# Patient Record
Sex: MALE | Race: BLACK OR AFRICAN AMERICAN | Employment: PART TIME | URBAN - METROPOLITAN AREA
[De-identification: names, ages, dates, MRNs, and addresses within clinical notes are randomized per-mention and may not be internally consistent; named-entity substitution may affect disease eponyms.]

---

## 2021-03-02 ENCOUNTER — IMMUNIZATION (OUTPATIENT)
Dept: INTERNAL MEDICINE CLINIC | Age: 36
End: 2021-03-02
Payer: MEDICAID

## 2021-03-02 DIAGNOSIS — Z23 ENCOUNTER FOR IMMUNIZATION: Primary | ICD-10-CM

## 2021-03-02 PROCEDURE — 0001A COVID-19, MRNA, LNP-S, PF, 30MCG/0.3ML DOSE(PFIZER): CPT | Performed by: FAMILY MEDICINE

## 2021-03-02 PROCEDURE — 91300 COVID-19, MRNA, LNP-S, PF, 30MCG/0.3ML DOSE(PFIZER): CPT | Performed by: FAMILY MEDICINE

## 2021-03-23 ENCOUNTER — IMMUNIZATION (OUTPATIENT)
Dept: INTERNAL MEDICINE CLINIC | Age: 36
End: 2021-03-23
Payer: MEDICAID

## 2021-03-23 DIAGNOSIS — Z23 ENCOUNTER FOR IMMUNIZATION: Primary | ICD-10-CM

## 2021-03-23 PROCEDURE — 91300 COVID-19, MRNA, LNP-S, PF, 30MCG/0.3ML DOSE(PFIZER): CPT | Performed by: FAMILY MEDICINE

## 2021-03-23 PROCEDURE — 0002A COVID-19, MRNA, LNP-S, PF, 30MCG/0.3ML DOSE(PFIZER): CPT | Performed by: FAMILY MEDICINE

## 2021-11-05 ENCOUNTER — IMMUNIZATION (OUTPATIENT)
Dept: INTERNAL MEDICINE CLINIC | Age: 36
End: 2021-11-05
Payer: MEDICAID

## 2021-11-05 DIAGNOSIS — Z23 ENCOUNTER FOR IMMUNIZATION: Primary | ICD-10-CM

## 2021-11-05 PROCEDURE — 0003A PR ADM SARSCOV2 30MCG/0.3ML 3RD (0003A): CPT | Performed by: FAMILY MEDICINE

## 2021-11-05 PROCEDURE — 91300 COVID-19, MRNA, LNP-S, PF, 30MCG/0.3ML DOSE(PFIZER): CPT | Performed by: FAMILY MEDICINE

## 2023-05-11 RX ORDER — HYDROCODONE BITARTRATE AND ACETAMINOPHEN 5; 325 MG/1; MG/1
1 TABLET ORAL EVERY 4 HOURS PRN
COMMUNITY
Start: 2011-07-12

## 2023-08-04 ENCOUNTER — HOSPITAL ENCOUNTER (INPATIENT)
Facility: HOSPITAL | Age: 38
LOS: 12 days | Discharge: HOME | DRG: 881 | End: 2023-08-16
Attending: EMERGENCY MEDICINE | Admitting: PSYCHIATRY & NEUROLOGY
Payer: COMMERCIAL

## 2023-08-04 ENCOUNTER — HOSPITAL ENCOUNTER (EMERGENCY)
Facility: HOSPITAL | Age: 38
Discharge: HOME | End: 2023-08-04
Attending: EMERGENCY MEDICINE
Payer: COMMERCIAL

## 2023-08-04 VITALS
TEMPERATURE: 97.9 F | BODY MASS INDEX: 40.81 KG/M2 | OXYGEN SATURATION: 95 % | RESPIRATION RATE: 20 BRPM | HEIGHT: 67 IN | HEART RATE: 84 BPM | WEIGHT: 260 LBS | DIASTOLIC BLOOD PRESSURE: 109 MMHG | SYSTOLIC BLOOD PRESSURE: 157 MMHG

## 2023-08-04 DIAGNOSIS — R45.851 SUICIDAL THOUGHTS: ICD-10-CM

## 2023-08-04 DIAGNOSIS — F32.A DEPRESSION, UNSPECIFIED DEPRESSION TYPE: ICD-10-CM

## 2023-08-04 DIAGNOSIS — R45.851 SUICIDAL IDEATIONS: Primary | ICD-10-CM

## 2023-08-04 DIAGNOSIS — F11.90 METHADONE USE: ICD-10-CM

## 2023-08-04 DIAGNOSIS — Z00.8 EVALUATION BY PSYCHIATRIC SERVICE REQUIRED: ICD-10-CM

## 2023-08-04 DIAGNOSIS — F43.10 PTSD (POST-TRAUMATIC STRESS DISORDER): ICD-10-CM

## 2023-08-04 DIAGNOSIS — F41.1 ANXIETY STATE: Primary | ICD-10-CM

## 2023-08-04 PROBLEM — F41.9 ANXIETY: Status: ACTIVE | Noted: 2023-08-04

## 2023-08-04 LAB
ALBUMIN SERPL-MCNC: 4 G/DL (ref 3.5–5)
ALBUMIN/GLOB SERPL: 1.2 (ref 1.1–2.2)
ALP SERPL-CCNC: 111 U/L (ref 45–117)
ALT SERPL-CCNC: 25 U/L (ref 12–78)
AMPHET UR QL SCN: NEGATIVE
AMPHET UR QL SCN: NEGATIVE
ANION GAP SERPL CALC-SCNC: 5 MMOL/L (ref 5–15)
APPEARANCE UR: CLEAR
AST SERPL-CCNC: 13 U/L (ref 15–37)
BACTERIA URNS QL MICRO: NEGATIVE /HPF
BARBITURATES UR QL SCN: NEGATIVE
BARBITURATES UR QL SCN: NEGATIVE
BASOPHILS # BLD: 0.1 K/UL (ref 0–0.1)
BASOPHILS NFR BLD: 1 % (ref 0–1)
BENZODIAZ UR QL: NEGATIVE
BENZODIAZ UR QL: NEGATIVE
BILIRUB SERPL-MCNC: 0.1 MG/DL (ref 0.2–1)
BILIRUB UR QL: NEGATIVE
BUN SERPL-MCNC: 13 MG/DL (ref 6–20)
BUN/CREAT SERPL: 14 (ref 12–20)
CALCIUM SERPL-MCNC: 9 MG/DL (ref 8.5–10.1)
CANNABINOIDS UR QL SCN: NEGATIVE
CANNABINOIDS UR QL SCN: NEGATIVE
CHLORIDE SERPL-SCNC: 105 MMOL/L (ref 97–108)
CO2 SERPL-SCNC: 32 MMOL/L (ref 21–32)
COCAINE UR QL SCN: NEGATIVE
COCAINE UR QL SCN: NEGATIVE
COLOR UR: ABNORMAL
CREAT SERPL-MCNC: 0.96 MG/DL (ref 0.7–1.3)
DIFFERENTIAL METHOD BLD: ABNORMAL
EOSINOPHIL # BLD: 0.3 K/UL (ref 0–0.4)
EOSINOPHIL NFR BLD: 2 % (ref 0–7)
EPITH CASTS URNS QL MICRO: ABNORMAL /LPF
ERYTHROCYTE [DISTWIDTH] IN BLOOD BY AUTOMATED COUNT: 13.2 % (ref 11.5–14.5)
ETHANOL SERPL-MCNC: <10 MG/DL (ref 0–0.08)
ETHANOL SERPL-MCNC: <10 MG/DL (ref 0–0.08)
FLUAV RNA SPEC QL NAA+PROBE: NOT DETECTED
FLUBV RNA SPEC QL NAA+PROBE: NOT DETECTED
GLOBULIN SER CALC-MCNC: 3.3 G/DL (ref 2–4)
GLUCOSE SERPL-MCNC: 110 MG/DL (ref 65–100)
GLUCOSE UR STRIP.AUTO-MCNC: NEGATIVE MG/DL
HCT VFR BLD AUTO: 41.5 % (ref 36.6–50.3)
HGB BLD-MCNC: 13.2 G/DL (ref 12.1–17)
HGB UR QL STRIP: ABNORMAL
IMM GRANULOCYTES # BLD AUTO: 0.1 K/UL (ref 0–0.04)
IMM GRANULOCYTES NFR BLD AUTO: 1 % (ref 0–0.5)
KETONES UR QL STRIP.AUTO: NEGATIVE MG/DL
LEUKOCYTE ESTERASE UR QL STRIP.AUTO: NEGATIVE
LYMPHOCYTES # BLD: 3.1 K/UL (ref 0.8–3.5)
LYMPHOCYTES NFR BLD: 26 % (ref 12–49)
Lab: ABNORMAL
Lab: ABNORMAL
MCH RBC QN AUTO: 28.4 PG (ref 26–34)
MCHC RBC AUTO-ENTMCNC: 31.8 G/DL (ref 30–36.5)
MCV RBC AUTO: 89.2 FL (ref 80–99)
METHADONE UR QL: POSITIVE
METHADONE UR QL: POSITIVE
MONOCYTES # BLD: 1 K/UL (ref 0–1)
MONOCYTES NFR BLD: 8 % (ref 5–13)
NEUTS SEG # BLD: 7.3 K/UL (ref 1.8–8)
NEUTS SEG NFR BLD: 62 % (ref 32–75)
NITRITE UR QL STRIP.AUTO: NEGATIVE
NRBC # BLD: 0 K/UL (ref 0–0.01)
NRBC BLD-RTO: 0 PER 100 WBC
OPIATES UR QL: POSITIVE
OPIATES UR QL: POSITIVE
PCP UR QL: NEGATIVE
PCP UR QL: NEGATIVE
PH UR STRIP: 7 (ref 5–8)
PLATELET # BLD AUTO: 246 K/UL (ref 150–400)
PMV BLD AUTO: 11.1 FL (ref 8.9–12.9)
POTASSIUM SERPL-SCNC: 4.4 MMOL/L (ref 3.5–5.1)
PROT SERPL-MCNC: 7.3 G/DL (ref 6.4–8.2)
PROT UR STRIP-MCNC: NEGATIVE MG/DL
RBC # BLD AUTO: 4.65 M/UL (ref 4.1–5.7)
RBC #/AREA URNS HPF: ABNORMAL /HPF (ref 0–5)
SARS-COV-2 RNA RESP QL NAA+PROBE: NOT DETECTED
SODIUM SERPL-SCNC: 142 MMOL/L (ref 136–145)
SP GR UR REFRACTOMETRY: 1.02
URINE CULTURE IF INDICATED: ABNORMAL
UROBILINOGEN UR QL STRIP.AUTO: 2 EU/DL (ref 0.2–1)
WBC # BLD AUTO: 11.9 K/UL (ref 4.1–11.1)
WBC URNS QL MICRO: ABNORMAL /HPF (ref 0–4)

## 2023-08-04 PROCEDURE — 81001 URINALYSIS AUTO W/SCOPE: CPT

## 2023-08-04 PROCEDURE — 80053 COMPREHEN METABOLIC PANEL: CPT

## 2023-08-04 PROCEDURE — 36415 COLL VENOUS BLD VENIPUNCTURE: CPT

## 2023-08-04 PROCEDURE — 1240000000 HC EMOTIONAL WELLNESS R&B

## 2023-08-04 PROCEDURE — 6370000000 HC RX 637 (ALT 250 FOR IP)

## 2023-08-04 PROCEDURE — 82077 ASSAY SPEC XCP UR&BREATH IA: CPT

## 2023-08-04 PROCEDURE — 90791 PSYCH DIAGNOSTIC EVALUATION: CPT

## 2023-08-04 PROCEDURE — 80307 DRUG TEST PRSMV CHEM ANLYZR: CPT

## 2023-08-04 PROCEDURE — 87636 SARSCOV2 & INF A&B AMP PRB: CPT

## 2023-08-04 PROCEDURE — 99285 EMERGENCY DEPT VISIT HI MDM: CPT

## 2023-08-04 PROCEDURE — 99283 EMERGENCY DEPT VISIT LOW MDM: CPT

## 2023-08-04 PROCEDURE — 85025 COMPLETE CBC W/AUTO DIFF WBC: CPT

## 2023-08-04 RX ORDER — DIPHENHYDRAMINE HYDROCHLORIDE 50 MG/ML
50 INJECTION INTRAMUSCULAR; INTRAVENOUS EVERY 4 HOURS PRN
Status: DISCONTINUED | OUTPATIENT
Start: 2023-08-04 | End: 2023-08-16 | Stop reason: HOSPADM

## 2023-08-04 RX ORDER — ACETAMINOPHEN 325 MG/1
650 TABLET ORAL EVERY 4 HOURS PRN
Status: DISCONTINUED | OUTPATIENT
Start: 2023-08-04 | End: 2023-08-16 | Stop reason: HOSPADM

## 2023-08-04 RX ORDER — TRAZODONE HYDROCHLORIDE 50 MG/1
50 TABLET ORAL NIGHTLY PRN
Status: DISCONTINUED | OUTPATIENT
Start: 2023-08-04 | End: 2023-08-16 | Stop reason: HOSPADM

## 2023-08-04 RX ORDER — HALOPERIDOL 5 MG/ML
5 INJECTION INTRAMUSCULAR EVERY 4 HOURS PRN
Status: DISCONTINUED | OUTPATIENT
Start: 2023-08-04 | End: 2023-08-16 | Stop reason: HOSPADM

## 2023-08-04 RX ORDER — HYDROXYZINE 50 MG/1
50 TABLET, FILM COATED ORAL EVERY 8 HOURS PRN
Qty: 30 TABLET | Refills: 0 | Status: SHIPPED | OUTPATIENT
Start: 2023-08-04 | End: 2023-08-04 | Stop reason: SDUPTHER

## 2023-08-04 RX ORDER — POLYETHYLENE GLYCOL 3350 17 G/17G
17 POWDER, FOR SOLUTION ORAL DAILY PRN
Status: DISCONTINUED | OUTPATIENT
Start: 2023-08-04 | End: 2023-08-16 | Stop reason: HOSPADM

## 2023-08-04 RX ORDER — HYDROXYZINE 50 MG/1
50 TABLET, FILM COATED ORAL EVERY 8 HOURS PRN
Qty: 30 TABLET | Refills: 0 | Status: ON HOLD | OUTPATIENT
Start: 2023-08-04 | End: 2023-08-16 | Stop reason: HOSPADM

## 2023-08-04 RX ORDER — MAGNESIUM HYDROXIDE/ALUMINUM HYDROXICE/SIMETHICONE 120; 1200; 1200 MG/30ML; MG/30ML; MG/30ML
30 SUSPENSION ORAL EVERY 6 HOURS PRN
Status: DISCONTINUED | OUTPATIENT
Start: 2023-08-04 | End: 2023-08-16 | Stop reason: HOSPADM

## 2023-08-04 RX ORDER — HALOPERIDOL 5 MG/1
5 TABLET ORAL EVERY 4 HOURS PRN
Status: DISCONTINUED | OUTPATIENT
Start: 2023-08-04 | End: 2023-08-16 | Stop reason: HOSPADM

## 2023-08-04 RX ORDER — HYDROXYZINE HYDROCHLORIDE 25 MG/1
50 TABLET, FILM COATED ORAL 3 TIMES DAILY PRN
Status: DISCONTINUED | OUTPATIENT
Start: 2023-08-04 | End: 2023-08-16 | Stop reason: HOSPADM

## 2023-08-04 RX ADMIN — TRAZODONE HYDROCHLORIDE 50 MG: 50 TABLET ORAL at 23:58

## 2023-08-04 RX ADMIN — HYDROXYZINE HYDROCHLORIDE 50 MG: 25 TABLET, FILM COATED ORAL at 23:58

## 2023-08-04 ASSESSMENT — PAIN - FUNCTIONAL ASSESSMENT
PAIN_FUNCTIONAL_ASSESSMENT: NONE - DENIES PAIN
PAIN_FUNCTIONAL_ASSESSMENT: NONE - DENIES PAIN

## 2023-08-04 ASSESSMENT — LIFESTYLE VARIABLES
HOW OFTEN DO YOU HAVE A DRINK CONTAINING ALCOHOL: NEVER
HOW MANY STANDARD DRINKS CONTAINING ALCOHOL DO YOU HAVE ON A TYPICAL DAY: PATIENT DOES NOT DRINK
HOW OFTEN DO YOU HAVE A DRINK CONTAINING ALCOHOL: NEVER
HOW MANY STANDARD DRINKS CONTAINING ALCOHOL DO YOU HAVE ON A TYPICAL DAY: PATIENT DOES NOT DRINK

## 2023-08-04 ASSESSMENT — SLEEP AND FATIGUE QUESTIONNAIRES
DO YOU USE A SLEEP AID: YES
SLEEP PATTERN: INSOMNIA
AVERAGE NUMBER OF SLEEP HOURS: 6
DO YOU HAVE DIFFICULTY SLEEPING: YES

## 2023-08-04 ASSESSMENT — ENCOUNTER SYMPTOMS
RHINORRHEA: 0
VOMITING: 0
SHORTNESS OF BREATH: 0
SORE THROAT: 0
DIARRHEA: 0
EYE PAIN: 0
NAUSEA: 0
ABDOMINAL PAIN: 0
COUGH: 0

## 2023-08-04 NOTE — ED NOTES
Discharge instructions were given to the patient by Thomas Ledezma RN  . The patient left the Emergency Department alert and oriented and in no acute distress with 1 prescription(s). The patient was encouraged to call or return to the ED for worsening issues or problems and was encouraged to schedule a follow up appointment for continuing care. The patient verbalized understanding of discharge instructions and prescriptions; all questions were answered. The patient has no further concerns at this time.          Thomas Ledezma RN  08/04/23 2158

## 2023-08-04 NOTE — BSMART NOTE
BSMART assessment completed, and suicide risk level noted to be low. Primary Nurse Tesfaye Caruso and Physician Dr. Don Cantrell notified. Concerns not observed. Security/Off- has not been notified.

## 2023-08-04 NOTE — ED TRIAGE NOTES
Patient presents to ED with c/o mental health problem. Patient states that he has having more suicidal thoughts with no plan. Patient was seen earlier and state that he wants to be admitted.

## 2023-08-04 NOTE — ED TRIAGE NOTES
Patient presents to ED with c/o suicidal ideations. Patient states that he has been feeling this way \"for a while. \" Denies any current plan.
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
## 2023-08-05 PROBLEM — F11.20 OPIOID USE DISORDER, MODERATE, DEPENDENCE (HCC): Status: ACTIVE | Noted: 2023-08-05

## 2023-08-05 PROBLEM — F43.10 PTSD (POST-TRAUMATIC STRESS DISORDER): Status: ACTIVE | Noted: 2023-08-05

## 2023-08-05 PROBLEM — F43.21 ADJUSTMENT DISORDER WITH DEPRESSED MOOD: Status: ACTIVE | Noted: 2023-08-05

## 2023-08-05 PROCEDURE — 99223 1ST HOSP IP/OBS HIGH 75: CPT | Performed by: PSYCHIATRY & NEUROLOGY

## 2023-08-05 PROCEDURE — 6370000000 HC RX 637 (ALT 250 FOR IP)

## 2023-08-05 PROCEDURE — 1240000000 HC EMOTIONAL WELLNESS R&B

## 2023-08-05 PROCEDURE — 6370000000 HC RX 637 (ALT 250 FOR IP): Performed by: PSYCHIATRY & NEUROLOGY

## 2023-08-05 RX ORDER — OMEPRAZOLE 40 MG/1
1 CAPSULE, DELAYED RELEASE ORAL
COMMUNITY
Start: 2023-05-23

## 2023-08-05 RX ORDER — METHADONE HYDROCHLORIDE 10 MG/1
40 TABLET ORAL DAILY
Status: COMPLETED | OUTPATIENT
Start: 2023-08-05 | End: 2023-08-05

## 2023-08-05 RX ORDER — MIRTAZAPINE 15 MG/1
15 TABLET, FILM COATED ORAL NIGHTLY
Status: DISCONTINUED | OUTPATIENT
Start: 2023-08-05 | End: 2023-08-09

## 2023-08-05 RX ORDER — FAMOTIDINE 20 MG/1
20 TABLET, FILM COATED ORAL 2 TIMES DAILY
COMMUNITY
Start: 2023-05-23

## 2023-08-05 RX ORDER — LOSARTAN POTASSIUM 50 MG/1
50 TABLET ORAL DAILY
Status: ON HOLD | COMMUNITY
Start: 2023-05-23 | End: 2023-08-16 | Stop reason: SDUPTHER

## 2023-08-05 RX ADMIN — HYDROXYZINE HYDROCHLORIDE 50 MG: 25 TABLET, FILM COATED ORAL at 21:26

## 2023-08-05 RX ADMIN — METHADONE HYDROCHLORIDE 115 MG: 10 TABLET ORAL at 12:16

## 2023-08-05 RX ADMIN — ALUMINUM HYDROXIDE, MAGNESIUM HYDROXIDE, AND SIMETHICONE 30 ML: 200; 200; 20 SUSPENSION ORAL at 10:28

## 2023-08-05 RX ADMIN — ALUMINUM HYDROXIDE, MAGNESIUM HYDROXIDE, AND SIMETHICONE 30 ML: 200; 200; 20 SUSPENSION ORAL at 21:26

## 2023-08-05 RX ADMIN — METHADONE HYDROCHLORIDE 40 MG: 10 TABLET ORAL at 10:51

## 2023-08-05 RX ADMIN — MIRTAZAPINE 15 MG: 15 TABLET, FILM COATED ORAL at 21:26

## 2023-08-05 RX ADMIN — TRAZODONE HYDROCHLORIDE 50 MG: 50 TABLET ORAL at 21:26

## 2023-08-05 ASSESSMENT — PAIN - FUNCTIONAL ASSESSMENT: PAIN_FUNCTIONAL_ASSESSMENT: ACTIVITIES ARE NOT PREVENTED

## 2023-08-05 ASSESSMENT — PAIN DESCRIPTION - LOCATION: LOCATION: ABDOMEN

## 2023-08-05 ASSESSMENT — PAIN SCALES - GENERAL: PAINLEVEL_OUTOF10: 5

## 2023-08-05 ASSESSMENT — PAIN DESCRIPTION - DESCRIPTORS: DESCRIPTORS: BURNING

## 2023-08-05 NOTE — PLAN OF CARE
0800 Patient awake and alert, oriented X 4. Cooperative and compliant with assessment, making eye contact. Vital signs within normal parameters. Feeling anxious at this time. Denied any depression, SI, HI, A/V hallucinations or agitation. Will continue to monitor.     Problem: Discharge Planning  Goal: Discharge to home or other facility with appropriate resources  8/5/2023 1101 by Corry Patel RN  Outcome: Progressing  8/4/2023 2336 by Khalif Stinson RN  Outcome: Progressing

## 2023-08-05 NOTE — ED NOTES
Pt ambulatory to and from the restroom with steady gait; specimen to be collected      Guadalupe Bernstein RN  08/04/23 2026

## 2023-08-05 NOTE — PROGRESS NOTES
[facility] Admission Pharmacy Medication Reconciliation    Information obtained from:  Patient interview, [address]: yes    Comments/recommendations:    1) The patient was interviewed regarding current PTA medication list, use and drug allergies. 2) Medication changes to PTA list:    Added - patient has not taken these for \"about 2-3 weeks\"  Famotidine 20 mg po bid  Losartan 50 mg po daily  Omeprazole 40 mg po daily on empty stomach  Removed  Hydrocodone/acetaminophen 5/325    Dose confirmation  Methadone 155 mg po daily - last dose 8/4/23 AM as oral liquid  Patient uses [facility]  Confirmed through [facility] [phone] (after hours message service)  Call back from [address]    3) The [place] Prescription Monitoring Program () was accessed to determine fill history of any controlled medications:  No record of controlled medications dispensed in 2023; (history of Adderall in 2022)       [employer] pharmacy benefit data reflects medications filled and processed through the patient's insurance, however                this data does NOT capture whether the medication was picked up or is currently being taken by the patient. Past Medical History/Disease States:  No past medical history on file. Patient allergies: Allergies as of 08/04/2023    (No Known Allergies)         Prior to Admission Medications   Prescriptions Last Dose Informant Patient Reported? Taking?    METHADONE HCL PO 8/4/2023 Self Yes Yes   Sig: Take 155 mg by mouth daily Dose verified by Rosie Marte [phone] Max Daily Amount: 155 mg   famotidine (PEPCID) 20 MG tablet Past Month Self Yes Yes   Sig: Take 1 tablet by mouth 2 times daily   hydrOXYzine HCl (ATARAX) 50 MG tablet 8/4/2023 Self No No   Sig: Take 1 tablet by mouth every 8 hours as needed for Anxiety   losartan (COZAAR) 50 MG tablet Past Month Self Yes

## 2023-08-05 NOTE — ED PROVIDER NOTES
EMERGENCY DEPARTMENT HISTORY AND PHYSICAL EXAM            Please note that this dictation was completed with the assistance of \"Dragon\", the computer voice recognition software. Quite often unanticipated grammatical, syntax, homophones, and other interpretive errors are inadvertently transcribed by the computer software. Please disregard these errors and any errors that have escaped final proofreading. Thank you. Date of Evaluation: 08/04/23  Patient: Gardenia Tyson  Patient Age and Sex: 34 y.o. male   MRN: [id number]  CSN: [id number]  PCP: Cheko Pearce MD    History of Present Illness     Chief Complaint   Patient presents with    Mental Health Problem     History Provided By: Patient/family/EMS (if available)    History is limited by: Nothing     HPI: Gardenia Tyson, 34 y.o. male with past medical history as documented below presents to the ED with c/o of mental health evaluation. Patient complaining of suicidal ideations without plan for the past month or so. States that he also has anxiety and PTSD. Patient was evaluated earlier and discharged home by behavioral health. Patient was prescribed Atarax. Patient does take methadone daily. Denies any homicidal ideations. No hallucinations. Denies any recent drug use. . Pt denies any other exacerbating or ameliorating factors. There are no other complaints, changes or physical findings pertinent to the HPI at this time. Nursing Notes were all reviewed and agreed with or any disagreements were addressed in the HPI. Past History   Past Medical History:  PTSD, methadone use    Past Surgical History:  No past surgical history on file. Family History:   Family history reviewed and was non-contributory, unless specified below:  No family history on file. Social History:  Social History     Tobacco Use    Smoking status: Every Day   Substance Use Topics    Alcohol use:  Yes       Allergies:  No Known Allergies    Current Medications:  No current

## 2023-08-05 NOTE — H&P
INITIAL PSYCHIATRIC EVALUATION            IDENTIFICATION:    Patient Name  Corie Vidales   Date of Birth 1985   [facility] [id number]   Medical Record Number  [id number]      Age  34 y.o. PCP Murali Urena MD   Admit date:  8/4/2023    Room Number  323/01  @ [facility]   Date of Service  8/5/2023            HISTORY         REASON FOR HOSPITALIZATION:  CC: \"suicidal ideation / detox\". Pt admitted under a voluntary basis for suicidal ideations proving to be an imminent danger to self and an inability to care for self. HISTORY OF PRESENT ILLNESS:    The patient, Corie Vidales, is a 34 y.o. Black / [place] American male with a past psychiatric history significant for opioid use disorder on ORT, who presents at this time with complaints of (and/or evidence of) the following emotional symptoms: depression and suicidal thoughts/threats. Additional symptomatology include escalation of polysubstance abuse. The above symptoms have been present for 24+ hours. These symptoms are of moderate to high severity. These symptoms are constant in nature. The patient's condition has been precipitated by psychosocial stressors. Patient's condition made worse by continued psychoactive drug use. UDS: +opiates; methadone; BAL=0. The patient was seen twice in the ED prior to admission. He endorsed anxiety and was sent home but then re-presented a few hours later and stated he did not feel safe on his own. Patient's wife accompanied him and corroborated his mental state as being off baseline. Patient reported that he had not used Fentanyl in weeks but acknowledges abusing this in addition to taking methadone (UDS positive for both agents). The patient is a fair historian. The patient corroborates the above narrative. The patient contracts for safety on the unit and gives consent for the team to contact collateral. The patient is amenable to initiating treatment while on the unit.  Methadone dose verified YELLOW/STRAW    Final    Appearance 08/04/2023 CLEAR  CLEAR   Final    Specific Gravity, UA 08/04/2023 1.020    Final    pH, Urine 08/04/2023 7.0  5.0 - 8.0   Final    Protein, UA 08/04/2023 Negative  NEG mg/dL Final    Glucose, UA 08/04/2023 Negative  NEG mg/dL Final    Ketones, Urine 08/04/2023 Negative  NEG mg/dL Final    Bilirubin Urine 08/04/2023 Negative  NEG   Final    Blood, Urine 08/04/2023 TRACE (A)  NEG   Final    Urobilinogen, Urine 08/04/2023 2.0 (H)  0.2 - 1.0 EU/dL Final    Nitrite, Urine 08/04/2023 Negative  NEG   Final    Leukocyte Esterase, Urine 08/04/2023 Negative  NEG   Final    WBC, UA 08/04/2023 0-4  0 - 4 /hpf Final    RBC, UA 08/04/2023 10-20  0 - 5 /hpf Final    Epithelial Cells UA 08/04/2023 FEW  FEW /lpf Final    BACTERIA, URINE 08/04/2023 Negative  NEG /hpf Final    Urine Culture if Indicated 08/04/2023 CULTURE NOT INDICATED BY UA RESULT  CNI   Final    Ethanol Lvl 08/04/2023 <10  <10 MG/DL Final    Sodium 08/04/2023 142  136 - 145 mmol/L Final    Potassium 08/04/2023 4.4  3.5 - 5.1 mmol/L Final    Chloride 08/04/2023 105  97 - 108 mmol/L Final    CO2 08/04/2023 32  21 - 32 mmol/L Final    Anion Gap 08/04/2023 5  5 - 15 mmol/L Final    Glucose 08/04/2023 110 (H)  65 - 100 mg/dL Final    BUN 08/04/2023 13  6 - 20 MG/DL Final    Creatinine 08/04/2023 0.96  0.70 - 1.30 MG/DL Final    Bun/Cre Ratio 08/04/2023 14  12 - 20   Final    Est, Glom Filt Rate 08/04/2023 >60  >60 ml/min/1.73m2 Final    Calcium 08/04/2023 9.0  8.5 - 10.1 MG/DL Final    Total Bilirubin 08/04/2023 0.1 (L)  0.2 - 1.0 MG/DL Final    ALT 08/04/2023 25  12 - 78 U/L Final    AST 08/04/2023 13 (L)  15 - 37 U/L Final    Alk Phosphatase 08/04/2023 111  45 - 117 U/L Final    Total Protein 08/04/2023 7.3  6.4 - 8.2 g/dL Final    Albumin 08/04/2023 4.0  3.5 - 5.0 g/dL Final    Globulin 08/04/2023 3.3  2.0 - 4.0 g/dL Final    Albumin/Globulin Ratio 08/04/2023 1.2  1.1 - 2.2   Final    WBC 08/04/2023 11.9 (H)  4.1 - 11.1 K/uL Final

## 2023-08-05 NOTE — PLAN OF CARE
Patient is an 34year old  male admitted to [address] unit. Patient is an direct VOL admit from [facility]. Patient has an admitting dx of Anxiety and PTSD. Patient reported to the ED with complaints of S/I. This was Patient's second visit to ER today, at which he was assessed earlier and d/c with resources. At this time, Patient has requested inpatient admission due to S/I with no plan, increased anxiety and episodes of panic attacks. Patient denies H/I or hallucinations. Patient also reported symptoms of insomnia and hx of PTSD. Patient would not elaborate on trauma hx. Patient has been prescribed Methadone 150 mg which he started following abuse of fentanyl. Patient stated that he has not used Fentanyl in some time, unable to verify last date. Patient works as a professor at [email or web address]. He has no previous psychiatric hx.  UDS (+) for methadone and opiates. BAL <10. NKDA reported. Patient reported a hx of HTN. Listed medications include Methadone SSCR 150 mg daily. Patient cooperative with admission process. Admission paperwork signed and unit tour completed. Q 15 minute rounds initiated for safety. Staff will continue to assess and monitor.         Problem: Discharge Planning  Goal: Discharge to home or other facility with appropriate resources  Outcome: Progressing repeat/scheduled

## 2023-08-06 PROCEDURE — 99232 SBSQ HOSP IP/OBS MODERATE 35: CPT | Performed by: PSYCHIATRY & NEUROLOGY

## 2023-08-06 PROCEDURE — 6370000000 HC RX 637 (ALT 250 FOR IP): Performed by: PSYCHIATRY & NEUROLOGY

## 2023-08-06 PROCEDURE — 6370000000 HC RX 637 (ALT 250 FOR IP)

## 2023-08-06 PROCEDURE — 1240000000 HC EMOTIONAL WELLNESS R&B

## 2023-08-06 RX ORDER — PANTOPRAZOLE SODIUM 40 MG/1
40 TABLET, DELAYED RELEASE ORAL
Status: DISCONTINUED | OUTPATIENT
Start: 2023-08-07 | End: 2023-08-07

## 2023-08-06 RX ADMIN — METHADONE HYDROCHLORIDE 155 MG: 10 TABLET ORAL at 08:26

## 2023-08-06 RX ADMIN — HALOPERIDOL 5 MG: 5 TABLET ORAL at 17:21

## 2023-08-06 RX ADMIN — HYDROXYZINE HYDROCHLORIDE 50 MG: 25 TABLET, FILM COATED ORAL at 13:12

## 2023-08-06 RX ADMIN — ALUMINUM HYDROXIDE, MAGNESIUM HYDROXIDE, AND SIMETHICONE 30 ML: 200; 200; 20 SUSPENSION ORAL at 10:56

## 2023-08-06 RX ADMIN — TRAZODONE HYDROCHLORIDE 50 MG: 50 TABLET ORAL at 21:17

## 2023-08-06 RX ADMIN — ALUMINUM HYDROXIDE, MAGNESIUM HYDROXIDE, AND SIMETHICONE 30 ML: 200; 200; 20 SUSPENSION ORAL at 04:06

## 2023-08-06 RX ADMIN — MIRTAZAPINE 15 MG: 15 TABLET, FILM COATED ORAL at 21:17

## 2023-08-06 ASSESSMENT — PAIN - FUNCTIONAL ASSESSMENT: PAIN_FUNCTIONAL_ASSESSMENT: ACTIVITIES ARE NOT PREVENTED

## 2023-08-06 ASSESSMENT — PAIN DESCRIPTION - DESCRIPTORS: DESCRIPTORS: BURNING

## 2023-08-06 ASSESSMENT — PAIN SCALES - GENERAL: PAINLEVEL_OUTOF10: 0

## 2023-08-06 ASSESSMENT — PAIN DESCRIPTION - LOCATION: LOCATION: ABDOMEN

## 2023-08-06 NOTE — PLAN OF CARE
Problem: Pain  Goal: Verbalizes/displays adequate comfort level or baseline comfort level  Outcome: Not Progressing     Problem: Anxiety  Goal: Will report anxiety at manageable levels  Description: INTERVENTIONS:  1. Administer medication as ordered  2. Teach and rehearse alternative coping skills  3. Provide emotional support with 1:1 interaction with staff  Outcome: Not Progressing     Problem: Discharge Planning  Goal: Discharge to home or other facility with appropriate resources  8/5/2023 2315 by Katy Orourke RN  Outcome: Progressing  8/5/2023 2313 by Katy Orourke RN  Outcome: Progressing  8/5/2023 1101 by Michelle Allen RN  Outcome: Progressing     Problem: Safety - Adult  Goal: Free from fall injury  Outcome: Progressing     Problem: Coping  Goal: Pt/Family able to verbalize concerns and demonstrate effective coping strategies  Description: INTERVENTIONS:  1. Assist patient/family to identify coping skills, available support systems and cultural and spiritual values  2. Provide emotional support, including active listening and acknowledgement of concerns of patient and caregivers  3. Reduce environmental stimuli, as able  4. Instruct patient/family in relaxation techniques, as appropriate  5. Assess for spiritual pain/suffering and initiate Spiritual Care, Psychosocial Clinical Specialist consults as needed  Outcome: Progressing     Problem: Depression/Self Harm  Goal: Effect of psychiatric condition will be minimized and patient will be protected from self harm  Description: INTERVENTIONS:  1. Assess impact of patient's symptoms on level of functioning, self care needs and offer support as indicated  2. Assess patient/family knowledge of depression, impact on illness and need for teaching  3. Provide emotional support, presence and reassurance  4. Assess for possible suicidal thoughts or ideation.  If patient expresses suicidal thoughts or statements do not leave alone, initiate Suicide Precautions,

## 2023-08-06 NOTE — PLAN OF CARE
0830 Patient awake and alert, oriented X 4. Cooperative and compliant with assessment, making eye contact. Vital signs within normal parameters. Denied any depression, anxiety, SI, HI, A/V hallucinations. No signs and symptoms of distress or discomfort noted. Will continue to monitor. Problem: Pain  Goal: Verbalizes/displays adequate comfort level or baseline comfort level  8/5/2023 2315 by Gera Peck RN  Outcome: Not Progressing     Problem: Pain  Goal: Verbalizes/displays adequate comfort level or baseline comfort level  8/5/2023 2315 by Gera Peck RN  Outcome: Not Progressing     Problem: Anxiety  Goal: Will report anxiety at manageable levels  Description: INTERVENTIONS:  1. Administer medication as ordered  2. Teach and rehearse alternative coping skills  3.  Provide emotional support with 1:1 interaction with staff  8/6/2023 1058 by Lobo Blood RN  Outcome: Progressing  8/5/2023 2315 by Gera Peck RN  Outcome: Not Progressing
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
## 2023-08-07 PROCEDURE — 6370000000 HC RX 637 (ALT 250 FOR IP)

## 2023-08-07 PROCEDURE — 1240000000 HC EMOTIONAL WELLNESS R&B

## 2023-08-07 PROCEDURE — 99232 SBSQ HOSP IP/OBS MODERATE 35: CPT | Performed by: PSYCHIATRY & NEUROLOGY

## 2023-08-07 PROCEDURE — 6370000000 HC RX 637 (ALT 250 FOR IP): Performed by: PSYCHIATRY & NEUROLOGY

## 2023-08-07 RX ORDER — NALOXONE HYDROCHLORIDE 0.4 MG/ML
0.4 INJECTION, SOLUTION INTRAMUSCULAR; INTRAVENOUS; SUBCUTANEOUS PRN
Status: DISCONTINUED | OUTPATIENT
Start: 2023-08-07 | End: 2023-08-16 | Stop reason: HOSPADM

## 2023-08-07 RX ORDER — PANTOPRAZOLE SODIUM 40 MG/1
40 TABLET, DELAYED RELEASE ORAL
Status: DISCONTINUED | OUTPATIENT
Start: 2023-08-08 | End: 2023-08-16 | Stop reason: HOSPADM

## 2023-08-07 RX ORDER — POLYETHYLENE GLYCOL 3350 17 G
2 POWDER IN PACKET (EA) ORAL
Status: DISCONTINUED | OUTPATIENT
Start: 2023-08-07 | End: 2023-08-16 | Stop reason: HOSPADM

## 2023-08-07 RX ORDER — CALCIUM CARBONATE 500 MG/1
500 TABLET, CHEWABLE ORAL 3 TIMES DAILY PRN
Status: DISCONTINUED | OUTPATIENT
Start: 2023-08-07 | End: 2023-08-16 | Stop reason: HOSPADM

## 2023-08-07 RX ORDER — NICOTINE 21 MG/24HR
1 PATCH, TRANSDERMAL 24 HOURS TRANSDERMAL DAILY
Status: DISCONTINUED | OUTPATIENT
Start: 2023-08-07 | End: 2023-08-16 | Stop reason: HOSPADM

## 2023-08-07 RX ADMIN — CALCIUM CARBONATE (ANTACID) CHEW TAB 500 MG 500 MG: 500 CHEW TAB at 21:35

## 2023-08-07 RX ADMIN — HYDROXYZINE HYDROCHLORIDE 50 MG: 25 TABLET, FILM COATED ORAL at 18:25

## 2023-08-07 RX ADMIN — TRAZODONE HYDROCHLORIDE 50 MG: 50 TABLET ORAL at 21:36

## 2023-08-07 RX ADMIN — ALUMINUM HYDROXIDE, MAGNESIUM HYDROXIDE, AND SIMETHICONE 30 ML: 200; 200; 20 SUSPENSION ORAL at 16:52

## 2023-08-07 RX ADMIN — METHADONE HYDROCHLORIDE 155 MG: 10 TABLET ORAL at 07:44

## 2023-08-07 RX ADMIN — PANTOPRAZOLE SODIUM 40 MG: 40 TABLET, DELAYED RELEASE ORAL at 07:44

## 2023-08-07 RX ADMIN — MIRTAZAPINE 15 MG: 15 TABLET, FILM COATED ORAL at 21:35

## 2023-08-07 RX ADMIN — ALUMINUM HYDROXIDE, MAGNESIUM HYDROXIDE, AND SIMETHICONE 30 ML: 200; 200; 20 SUSPENSION ORAL at 11:32

## 2023-08-07 RX ADMIN — NICOTINE POLACRILEX 2 MG: 2 LOZENGE ORAL at 16:19

## 2023-08-07 RX ADMIN — NICOTINE POLACRILEX 2 MG: 2 LOZENGE ORAL at 21:35

## 2023-08-07 ASSESSMENT — PAIN SCALES - GENERAL
PAINLEVEL_OUTOF10: 0
PAINLEVEL_OUTOF10: 0

## 2023-08-07 NOTE — PLAN OF CARE
Problem: Depression/Self Harm  Goal: Effect of psychiatric condition will be minimized and patient will be protected from self harm  Description: INTERVENTIONS:  1. Assess impact of patient's symptoms on level of functioning, self care needs and offer support as indicated  2. Assess patient/family knowledge of depression, impact on illness and need for teaching  3. Provide emotional support, presence and reassurance  4. Assess for possible suicidal thoughts or ideation. If patient expresses suicidal thoughts or statements do not leave alone, initiate Suicide Precautions, move to a room close to the nursing station and obtain sitter  5. Initiate consults as appropriate with Mental Health Professional, Spiritual Care, Psychosocial CNS, and consider a recommendation to the LIP for a Psychiatric Consultation  8/6/2023 2214 by Robert Baltazar RN  Outcome: Not Progressing     Problem: Depression/Self Harm  Goal: Effect of psychiatric condition will be minimized and patient will be protected from self harm  Description: INTERVENTIONS:  1. Assess impact of patient's symptoms on level of functioning, self care needs and offer support as indicated  2. Assess patient/family knowledge of depression, impact on illness and need for teaching  3. Provide emotional support, presence and reassurance  4. Assess for possible suicidal thoughts or ideation. If patient expresses suicidal thoughts or statements do not leave alone, initiate Suicide Precautions, move to a room close to the nursing station and obtain sitter  5.  Initiate consults as appropriate with Mental Health Professional, Spiritual Care, Psychosocial CNS, and consider a recommendation to the LIP for a Psychiatric Consultation  8/6/2023 2214 by Robert Baltazar RN  Outcome: Not Progressing

## 2023-08-07 NOTE — GROUP NOTE
Group Therapy Note    Date: 8/7/2023    Group Start Time: 1115  Group End Time: 6869  Group Topic: Topic Group    [facility]    Sandra Gaming        Group Therapy Note    Attendees:        Patient's Goal:  To participate in relaxation activity       Status After Intervention:  Improved    Participation Level: Interactive    Participation Quality: Attentive and Sharing      Speech:  normal      Thought Process/Content: Logical      Affective Functioning: Congruent      Level of consciousness:  Attentive      Response to Learning: Progressing to goal      Endings: None Reported    Modes of Intervention: Activity      Discipline Responsible: Recreational Therapist      Signature:  Lucie Nichols

## 2023-08-07 NOTE — BH NOTE
E/M Psychiatric Progress Note    Patient: Latanya Sewell MRN: [id number]  SSN: [id number]    YOB: 1985  Age: 34 y.o. Sex: male      Admit Date: 8/4/2023    LOS: 3 days     Chief Complaint: suicidal ideation    Subjective:     HPI / INTERVAL HISTORY:    The patient, Latanya Sewell, is a 34 y.o. Black / [place] American male with a past psychiatric history significant for opioid use disorder on ORT, who presents at this time with complaints of (and/or evidence of) the following emotional symptoms: depression and suicidal thoughts/threats. Additional symptomatology include escalation of polysubstance abuse. The above symptoms have been present for 24+ hours. These symptoms are of moderate to high severity. These symptoms are constant in nature. The patient's condition has been precipitated by psychosocial stressors. Patient's condition made worse by continued psychoactive drug use. UDS: +opiates; methadone; BAL=0. The patient was seen twice in the ED prior to admission. He endorsed anxiety and was sent home but then re-presented a few hours later and stated he did not feel safe on his own. Patient's wife accompanied him and corroborated his mental state as being off baseline. Patient reported that he had not used Fentanyl in weeks but acknowledges abusing this in addition to taking methadone (UDS positive for both agents). The patient is a fair historian. The patient corroborates the above narrative. The patient contracts for safety on the unit and gives consent for the team to contact collateral. The patient is amenable to initiating treatment while on the unit. Methadone dose verified via [facility]. 08/06 - no acute overnight events. The patient has been depressed, flat but cooperative. He c/o heartburn otherwise is doing well physically. He is tolerating medication thus far and slept 7 hours overnight. Patient got Mylanta x2 for heartburn with fair effect.  He

## 2023-08-07 NOTE — BH NOTE
PSYCHOSOCIAL ASSESSMENT  :Patient identifying info:   Caroline Cleveland is a 34 y.o., male admitted 8/4/2023  7:51 PM     Presenting problem and precipitating factors: Pt presents with SI with no plan for past month. Pt reports feeling \"in the dumps\" and having regular panic attacks. Pt reportedly has been shaking in his sleep. Pt denies plan for SI or ever attempting SI. Pt reported he is usually able to manage his symptoms when he is around his wife and children, but when he is not with them and becomes anxious he is not able to cope. Pt has a history of abusing fentanyl and is a current methadone patient at the [facility] methadone clinic. Pt tested positive for opiates and when told this stated, \"I know, it takes forever for them to get out of your system. \"  He then said he has not used fentanyl in 5-6 weeks. He denied homicidal ideation and symptoms of psychosis    Mental status assessment: Pt presents ao x 4. Pt appearance appropriate. Pt speech unremarkable. Pt mood withdrawn. Pt affect constricted. Pt thought process linear and logical. Pt sleep shows evidence of insomnia. Pt insight and judgment poor. Strengths/Recreation/Coping Skills:Pt has family. GRACIELA Perez Group: [id number] Member: [id number]   Effective from: 8/1/2023 Subscriber: Caroline Cleveland Subscriber ID: [id number]   Guarantor: Velazquez Megha       Collateral information:     Name Relation Home Work [address] Other   [phone]       Current psychiatric /substance abuse providers and contact info: [facility] methadone clinic. Previous psychiatric/substance abuse providers and response to treatment: Pt has no previous hospitalizations. Family history of mental illness or substance abuse: Unable to assess, need collateral.    Substance abuse history:    Social History     Tobacco Use    Smoking status: Every Day    Smokeless tobacco: Not on file   Substance Use Topics    Alcohol use:  Yes

## 2023-08-07 NOTE — BH NOTE
E/M Psychiatric Progress Note    Patient: Caroline Cleveland MRN: [id number]  SSN: [id number]    YOB: 1985  Age: 34 y.o. Sex: male      Admit Date: 8/4/2023    LOS: 2 days     Chief Complaint: suicidal ideation    Subjective:     HPI / INTERVAL HISTORY:    The patient, Caroline Cleveland, is a 34 y.o. Black / [place] American male with a past psychiatric history significant for opioid use disorder on ORT, who presents at this time with complaints of (and/or evidence of) the following emotional symptoms: depression and suicidal thoughts/threats. Additional symptomatology include escalation of polysubstance abuse. The above symptoms have been present for 24+ hours. These symptoms are of moderate to high severity. These symptoms are constant in nature. The patient's condition has been precipitated by psychosocial stressors. Patient's condition made worse by continued psychoactive drug use. UDS: +opiates; methadone; BAL=0. The patient was seen twice in the ED prior to admission. He endorsed anxiety and was sent home but then re-presented a few hours later and stated he did not feel safe on his own. Patient's wife accompanied him and corroborated his mental state as being off baseline. Patient reported that he had not used Fentanyl in weeks but acknowledges abusing this in addition to taking methadone (UDS positive for both agents). The patient is a fair historian. The patient corroborates the above narrative. The patient contracts for safety on the unit and gives consent for the team to contact collateral. The patient is amenable to initiating treatment while on the unit. Methadone dose verified via [facility]. 08/06 - no acute overnight events. The patient has been depressed, flat but cooperative. He c/o heartburn otherwise is doing well physically. He is tolerating medication thus far and slept 7 hours overnight. Patient got Mylanta x2 for heartburn with fair effect.  He

## 2023-08-07 NOTE — BH NOTE
Alert and oriented x 4,calm,cooperative and medication compliant. Reported little bit of anxiety,depression 4,no SI/HI/AVH and pain. Pt appeared sad with a depressed mood non interactive with others in the community. Prn trazodone given for insomnia. Safety checks on going Q 15 minutes. Pt appeared to have slept for 6.5 hrs.

## 2023-08-07 NOTE — BH NOTE
Pt is calm and cooperative. Visible on the unit. Accepting meals and medications. Socializing with peers. Pt denies si/hi/a/v godinez. Pt endorses anxiety and depression. Pt also has a complaint of bad acid reflux. Pt was given Maalox x2. Pt states it does not work that well. MD notified. States he will order pt something. Given prn nicotine lozenge. Will continue to monitor pt.

## 2023-08-07 NOTE — PLAN OF CARE
Problem: Depression/Self Harm  Goal: Effect of psychiatric condition will be minimized and patient will be protected from self harm  Description: INTERVENTIONS:  1. Assess impact of patient's symptoms on level of functioning, self care needs and offer support as indicated  2. Assess patient/family knowledge of depression, impact on illness and need for teaching  3. Provide emotional support, presence and reassurance  4. Assess for possible suicidal thoughts or ideation. If patient expresses suicidal thoughts or statements do not leave alone, initiate Suicide Precautions, move to a room close to the nursing station and obtain sitter  5. Initiate consults as appropriate with Mental Health Professional, Spiritual Care, Psychosocial CNS, and consider a recommendation to the LIP for a Psychiatric Consultation  Outcome: Not Progressing     Problem: Safety - Adult  Goal: Free from fall injury  Outcome: Progressing     Problem: Pain  Goal: Verbalizes/displays adequate comfort level or baseline comfort level  Outcome: Progressing     Problem: Anxiety  Goal: Will report anxiety at manageable levels  Description: INTERVENTIONS:  1. Administer medication as ordered  2. Teach and rehearse alternative coping skills  3. Provide emotional support with 1:1 interaction with staff  8/6/2023 2214 by Yanni Sharp RN  Outcome: Progressing  8/6/2023 1058 by Sarah Moreno RN  Outcome: Progressing  Flowsheets (Taken 8/6/2023 0800)  Will report anxiety at manageable levels: Administer medication as ordered     Problem: Coping  Goal: Pt/Family able to verbalize concerns and demonstrate effective coping strategies  Description: INTERVENTIONS:  1. Assist patient/family to identify coping skills, available support systems and cultural and spiritual values  2. Provide emotional support, including active listening and acknowledgement of concerns of patient and caregivers  3. Reduce environmental stimuli, as able  4.  Instruct patient/family in

## 2023-08-07 NOTE — GROUP NOTE
Group Therapy Note    Date: 8/7/2023    Group Start Time: 1500  Group End Time: 1600  Group Topic: Recreational    [facility] [place] [address] ACUTE BEHAV HLTH    [address]        Group Therapy Note    Attendees:        Patient's Goal:  To concentrate on selected task      Status After Intervention:  Improved    Participation Level: Interactive    Participation Quality: Attentive and Sharing      Speech:  normal      Thought Process/Content: Logical      Affective Functioning: Congruent      Mood: euthymic      Level of consciousness:  Attentive      Response to Learning: Progressing to goal      Endings: None Reported    Modes of Intervention: Socialization and Activity      Discipline Responsible: Recreational Therapist      Signature:  Melinda Harris

## 2023-08-08 PROCEDURE — 6370000000 HC RX 637 (ALT 250 FOR IP): Performed by: PSYCHIATRY & NEUROLOGY

## 2023-08-08 PROCEDURE — 1240000000 HC EMOTIONAL WELLNESS R&B

## 2023-08-08 PROCEDURE — 99232 SBSQ HOSP IP/OBS MODERATE 35: CPT | Performed by: PSYCHIATRY & NEUROLOGY

## 2023-08-08 PROCEDURE — 6370000000 HC RX 637 (ALT 250 FOR IP)

## 2023-08-08 RX ADMIN — PANTOPRAZOLE SODIUM 40 MG: 40 TABLET, DELAYED RELEASE ORAL at 14:13

## 2023-08-08 RX ADMIN — NICOTINE POLACRILEX 2 MG: 2 LOZENGE ORAL at 21:11

## 2023-08-08 RX ADMIN — NICOTINE POLACRILEX 2 MG: 2 LOZENGE ORAL at 09:00

## 2023-08-08 RX ADMIN — MIRTAZAPINE 15 MG: 15 TABLET, FILM COATED ORAL at 21:11

## 2023-08-08 RX ADMIN — TRAZODONE HYDROCHLORIDE 50 MG: 50 TABLET ORAL at 21:11

## 2023-08-08 RX ADMIN — METHADONE HYDROCHLORIDE 155 MG: 10 TABLET ORAL at 08:56

## 2023-08-08 RX ADMIN — HYDROXYZINE HYDROCHLORIDE 50 MG: 25 TABLET, FILM COATED ORAL at 03:45

## 2023-08-08 RX ADMIN — HYDROXYZINE HYDROCHLORIDE 50 MG: 25 TABLET, FILM COATED ORAL at 14:13

## 2023-08-08 RX ADMIN — ALUMINUM HYDROXIDE, MAGNESIUM HYDROXIDE, AND SIMETHICONE 30 ML: 200; 200; 20 SUSPENSION ORAL at 08:58

## 2023-08-08 RX ADMIN — NICOTINE POLACRILEX 2 MG: 2 LOZENGE ORAL at 16:45

## 2023-08-08 RX ADMIN — PANTOPRAZOLE SODIUM 40 MG: 40 TABLET, DELAYED RELEASE ORAL at 08:56

## 2023-08-08 RX ADMIN — NICOTINE POLACRILEX 2 MG: 2 LOZENGE ORAL at 14:13

## 2023-08-08 RX ADMIN — HYDROXYZINE HYDROCHLORIDE 50 MG: 25 TABLET, FILM COATED ORAL at 21:11

## 2023-08-08 RX ADMIN — NICOTINE POLACRILEX 2 MG: 2 LOZENGE ORAL at 07:06

## 2023-08-08 RX ADMIN — CALCIUM CARBONATE (ANTACID) CHEW TAB 500 MG 500 MG: 500 CHEW TAB at 08:56

## 2023-08-08 ASSESSMENT — PAIN DESCRIPTION - LOCATION: LOCATION: OTHER (COMMENT)

## 2023-08-08 ASSESSMENT — PAIN SCALES - GENERAL: PAINLEVEL_OUTOF10: 3

## 2023-08-08 NOTE — BH NOTE
SW reached out to wife, Chago Xiao, [phone]. No answer, left VM requesting callback.     NORA John

## 2023-08-08 NOTE — BH NOTE
Behavioral Health Treatment Team Note     Patient goal(s) for today: Take medications as prescribed, engage in unit activities, continue taking meds as, participate in hygiene/ADLs, prepare for discharge, follow directions from staff,   Treatment team focus/goals: Patient will continue to maintain a calm and cooperative mood and communicate their needs with staff. Continue taking meds as prescribed, engage in unit activities, participate in hygiene/ADLs, prepare for discharge, follow directions from staff, contact support team, attend groups    Progress note: Patient presented with a neat appearance and a fair affect. He was unable to sleep last night. He was unable to get on his tablet. Patient does not feel ready for discharge. Patient is still reporting negative thoughts. DC planned for Thursday. Patient requested to see a \"PTSD\" doctor. SW will recommend a therapist.     LOS:  4  Expected LOS: End of week    Insurance info/prescription coverage:  [id number] [email or web address]  Date of last family contact:  attempted today  Family requesting physician contact today:  No  Discharge plan:  Home  Guns in the home:  No  Outpatient provider(s):   To be linked    Participating treatment team members: Rita Brown, NORA Lane, Laura Aldrich MD

## 2023-08-08 NOTE — BH NOTE
This writer spoke with  David Hirsch ([phone]) from [facility], she is patient's current PO. She reports that she was informed that he was hospitalized and informed this writer that he is in a program that if he tests positive for drugs, he immediately violates probation. This writer confirmed with PO that upon admission, he tested positive for opiates. Per PO Divens, patient may have a warrant out by the end of the week. She reports that his wife has been notified and to contact her prior to patient discharging.  SW will update patient and MD. Aixa Gracia, supervisee in social work

## 2023-08-08 NOTE — PROGRESS NOTES
Pt screened per LOS policy. No acute nutrition or weight issues identified. Pt  meal compliant. Double portions ordered to help meet ~ needs @ [facility]. Hx notable for elev BMI, substance abuse (UDS + for opiates, methadone) including pt admitting to fentanyl use. Ht: 5'7\"  Wt: 265 lb  BMI: 41.50 kg/(m^2) c/w obesity class III (morbid).   Est energy needs: 2200 kcal, 74 g protein, 2500 mL/fluids  Pt will consume > 75% of meals at follow up 7-10 days  LOS

## 2023-08-08 NOTE — BH NOTE
Pt is calm and cooperative. Visible on the unit. Accepting meals and medications. Socializing with peers. Pt denies si/hi/a/v godinez. Pt endorses anxiety and depression. Pt also has a complaint acid reflux. Pt was given prn Maalox,nicotine lozenge, hydroxyzine and  Tums. Pt was able to use I PAD today per MD order. Will continue to monitor pt.

## 2023-08-08 NOTE — PLAN OF CARE
Problem: Discharge Planning  Goal: Discharge to home or other facility with appropriate resources  Outcome: Progressing     Problem: Safety - Adult  Goal: Free from fall injury  Outcome: Progressing     Problem: Anxiety  Goal: Will report anxiety at manageable levels  Description: INTERVENTIONS:  1. Administer medication as ordered  2. Teach and rehearse alternative coping skills  3. Provide emotional support with 1:1 interaction with staff  Outcome: Progressing     Problem: Coping  Goal: Pt/Family able to verbalize concerns and demonstrate effective coping strategies  Description: INTERVENTIONS:  1. Assist patient/family to identify coping skills, available support systems and cultural and spiritual values  2. Provide emotional support, including active listening and acknowledgement of concerns of patient and caregivers  3. Reduce environmental stimuli, as able  4. Instruct patient/family in relaxation techniques, as appropriate  5. Assess for spiritual pain/suffering and initiate Spiritual Care, Psychosocial Clinical Specialist consults as needed  Outcome: Progressing     Problem: Decision Making  Goal: Pt/Family able to effectively weigh alternatives and participate in decision making related to treatment and care  Description: INTERVENTIONS:  1. Determine when there are differences between patient's view, family's view, and healthcare provider's view of condition  2. Facilitate patient and family articulation of goals for care  3. Help patient and family identify pros/cons of alternative solutions  4. Provide information as requested by patient/family  5. Respect patient/family right to receive or not to receive information  6. Serve as a liaison between patient and family and health care team  7.  Initiate Consults from Ethics, Palliative Care or initiate [phone] as is appropriate  Outcome: Progressing

## 2023-08-08 NOTE — BH NOTE
Group Therapy Note    Date: 8/8/2023  Start Time: 2:00  End Time:  3:00  Number of Participants: 8  Discipline Responsible: /Counselor    Type of Group: Psychoeducation  Group Topic: Cycle of Depression    Group facilitator conducted educational group in regards to identifying depression symptoms, defining what depression is, as well as the cycle of depression. Group facilitator at first was able to define depression and encouraged group members to discuss their individual experiences with depression. Group members were able to identify physical symptoms of depression. Group facilitator then transitioned into the cycle of depression which included the following: Stressors, thoughts, feelings, behavioral responses. Group members were able to remain engaged throughout group as well as touch on individual experiences both acute and chronic that led them to their current hospitalization. Patient was able to support other members who shared in group this afternoon. Patient continues to show progress towards his goals by attending group therapy.      ATTENDANCE: active         Signature:  Gogo Michael, supervisee in social work

## 2023-08-08 NOTE — PROGRESS NOTES
2130: Megan Reynoso is observed to be awake, visible in the milieu, sitting in the day room interactive with peers. He was pleasant upon approach. His hygiene is adequate, and he is independent in ADLs. Pt gait is steady. Megan Reynoso reports that his appetite is WNL, but he has trouble staying asleep at night. Megan Reynoso denies pain, SI/HI/ and AVH. He rates depression 5/10 and anxiety 7/10. He describes his mood as \"blah\". Pt is compliant with scheduled meds. PRN meds given along with bed time medications per Pt request. Pt also received atarax at 0345 for anxiety. Pt encouraged to continue to participate in care. Pt will continue to monitor pt with q 15 min checks and hourly nursing rounds. Pt slept for a total of 7 hrs.

## 2023-08-08 NOTE — BH NOTE
E/M Psychiatric Progress Note    Patient: Judith Antunez MRN: [id number]  SSN: [id number]    YOB: 1985  Age: 34 y.o. Sex: male      Admit Date: 8/4/2023    LOS: 4 days     Chief Complaint: suicidal ideation    Subjective:     HPI / INTERVAL HISTORY:    The patient, Judith Antunez, is a 34 y.o. Black / [place] American male with a past psychiatric history significant for opioid use disorder on ORT, who presents at this time with complaints of (and/or evidence of) the following emotional symptoms: depression and suicidal thoughts/threats. Additional symptomatology include escalation of polysubstance abuse. The above symptoms have been present for 24+ hours. These symptoms are of moderate to high severity. These symptoms are constant in nature. The patient's condition has been precipitated by psychosocial stressors. Patient's condition made worse by continued psychoactive drug use. UDS: +opiates; methadone; BAL=0. The patient was seen twice in the ED prior to admission. He endorsed anxiety and was sent home but then re-presented a few hours later and stated he did not feel safe on his own. Patient's wife accompanied him and corroborated his mental state as being off baseline. Patient reported that he had not used Fentanyl in weeks but acknowledges abusing this in addition to taking methadone (UDS positive for both agents). The patient is a fair historian. The patient corroborates the above narrative. The patient contracts for safety on the unit and gives consent for the team to contact collateral. The patient is amenable to initiating treatment while on the unit. Methadone dose verified via [facility]. 08/06 - no acute overnight events. The patient has been depressed, flat but cooperative. He c/o heartburn otherwise is doing well physically. He is tolerating medication thus far and slept 7 hours overnight. Patient got Mylanta x2 for heartburn with fair effect.  He patient's condition, or for diagnostic study, and that the patient continues to need, on a daily basis, active treatment furnished directly by or requiring the supervision of inpatient psychiatric facility personnel. In addition, the hospital records show that services furnished were intensive treatment services, admission or related services, or equivalent services.     Signed By: Joselyn Momin MD     August 8, 2023

## 2023-08-08 NOTE — GROUP NOTE
Group Therapy Note    Date: 8/8/2023    Group Start Time: 0900  Group End Time: 1000  Group Topic: Topic Group    [address] ACUTE BEHAV [address] [facility]        Group Therapy Note    Attendees:        Patient's Goal:  To participate in chair exercise routine    Notes:  Pt did not attend session    Discipline Responsible: Recreational Therapist      Signature:  Chu Kelly

## 2023-08-09 PROCEDURE — 6370000000 HC RX 637 (ALT 250 FOR IP)

## 2023-08-09 PROCEDURE — 6370000000 HC RX 637 (ALT 250 FOR IP): Performed by: PSYCHIATRY & NEUROLOGY

## 2023-08-09 PROCEDURE — 99232 SBSQ HOSP IP/OBS MODERATE 35: CPT | Performed by: PSYCHIATRY & NEUROLOGY

## 2023-08-09 PROCEDURE — 1240000000 HC EMOTIONAL WELLNESS R&B

## 2023-08-09 RX ORDER — MECOBALAMIN 5000 MCG
5 TABLET,DISINTEGRATING ORAL NIGHTLY
Status: DISCONTINUED | OUTPATIENT
Start: 2023-08-09 | End: 2023-08-16 | Stop reason: HOSPADM

## 2023-08-09 RX ORDER — MIRTAZAPINE 15 MG/1
30 TABLET, FILM COATED ORAL NIGHTLY
Status: DISCONTINUED | OUTPATIENT
Start: 2023-08-09 | End: 2023-08-09

## 2023-08-09 RX ORDER — BUSPIRONE HYDROCHLORIDE 10 MG/1
10 TABLET ORAL 2 TIMES DAILY
Status: DISCONTINUED | OUTPATIENT
Start: 2023-08-09 | End: 2023-08-14

## 2023-08-09 RX ORDER — MIRTAZAPINE 15 MG/1
15 TABLET, FILM COATED ORAL NIGHTLY
Status: DISCONTINUED | OUTPATIENT
Start: 2023-08-09 | End: 2023-08-16 | Stop reason: HOSPADM

## 2023-08-09 RX ADMIN — BUSPIRONE HYDROCHLORIDE 10 MG: 10 TABLET ORAL at 21:55

## 2023-08-09 RX ADMIN — Medication 5 MG: at 21:48

## 2023-08-09 RX ADMIN — HYDROXYZINE HYDROCHLORIDE 50 MG: 25 TABLET, FILM COATED ORAL at 16:26

## 2023-08-09 RX ADMIN — PANTOPRAZOLE SODIUM 40 MG: 40 TABLET, DELAYED RELEASE ORAL at 16:26

## 2023-08-09 RX ADMIN — TRAZODONE HYDROCHLORIDE 50 MG: 50 TABLET ORAL at 21:48

## 2023-08-09 RX ADMIN — NICOTINE POLACRILEX 2 MG: 2 LOZENGE ORAL at 16:26

## 2023-08-09 RX ADMIN — PANTOPRAZOLE SODIUM 40 MG: 40 TABLET, DELAYED RELEASE ORAL at 09:02

## 2023-08-09 RX ADMIN — MIRTAZAPINE 15 MG: 15 TABLET, FILM COATED ORAL at 21:48

## 2023-08-09 RX ADMIN — METHADONE HYDROCHLORIDE 155 MG: 10 TABLET ORAL at 08:57

## 2023-08-09 RX ADMIN — NICOTINE POLACRILEX 2 MG: 2 LOZENGE ORAL at 21:48

## 2023-08-09 RX ADMIN — HYDROXYZINE HYDROCHLORIDE 50 MG: 25 TABLET, FILM COATED ORAL at 09:02

## 2023-08-09 NOTE — PROGRESS NOTES
Patient actively participated in Spirituality Group about standing on your beliefs and convictions and finding rest, and managing one's emotions in the Behavioral Health unit.  utilized music, lyrics to favorite songs, words of encouragement and affirmation, scripture, and testimony to facilitate the group discussion and enhance group dynamics as well as devotionals. .  Rev.  Gigi Pak, [address] [facility], [address] [facility] [place]

## 2023-08-09 NOTE — GROUP NOTE
Group Therapy Note    Date: 8/9/2023    Group Start Time: 0900  Group End Time: 1000  Group Topic: Topic Group    [place] [address] ACUTE BEHAV [address] [facility]        Group Therapy Note    Attendees:        Patient's Goal:  To identify positive coping strategies a-z      Status After Intervention:  Improved    Participation Level: Interactive    Participation Quality: Appropriate, Attentive, and Sharing      Speech:  normal      Thought Process/Content: Logical      Affective Functioning: Congruent      Mood: euthymic      Level of consciousness:  Oriented x4 and Attentive      Response to Learning: Progressing to goal      Endings: None Reported    Modes of Intervention: Education      Discipline Responsible: Recreational Therapist      Signature:  Peg Parr

## 2023-08-09 NOTE — BH NOTE
Behavioral Health Treatment Team Note     Patient goal(s) for today: Take medications as prescribed, engage in unit activities, continue taking meds as, participate in hygiene/ADLs, prepare for discharge, follow directions from staff,   Treatment team focus/goals: Patient will continue to maintain a calm and cooperative mood and communicate their needs with staff. Continue taking meds as prescribed, engage in unit activities, participate in hygiene/ADLs, prepare for discharge, follow directions from staff, contact support team, attend groups    Progress note: Patient appears neat and fair. Patient is reporting that he has been having trouble sleeping and believes that the medications are not working.  has been calling, because patient has violated his parole. Patient reports that he is part of a substance use program. He has had opioid in his system. He believes that the PO is \"out for him\". SW speak to wife. LOS:  5  Expected LOS: End of week    Insurance info/prescription coverage:  [employer]  Date of last family contact:  attempted today  Family requesting physician contact today:  No  Discharge plan:  Home  Guns in the home:  No  Outpatient provider(s):   To be linked     Participating treatment team members: Brian Andrews, Daryle Naas, MSW, Jennifer Weathers MD

## 2023-08-09 NOTE — BH NOTE
Patient alert, calm, cooperative. Denies SI/HI. Denies depression. Denies pain. Denies AVH. Endorsed anxiety. Hydroxyzine given as needed with good effect. Medication and meal compliant. No distress observed. No concerns expressed at this time. Q15 minutes and hourly checks ongoing.

## 2023-08-09 NOTE — GROUP NOTE
Group Therapy Note    Date: 8/9/2023    Group Start Time: 1500  Group End Time: 1600  Group Topic: Recreational    [address] ACUTE BEHAV HLTH    [address]        Group Therapy Note    Attendees:        Patient's Goal:  To concentrate on selected task       Status After Intervention:  Improved    Participation Level: Interactive    Participation Quality: Appropriate, Attentive, and Sharing      Speech:  normal      Thought Process/Content: Logical      Affective Functioning: Congruent      Mood: euthymic      Level of consciousness:  Oriented x4 and Attentive      Response to Learning: Progressing to goal      Endings: None Reported    Modes of Intervention: Socialization and Activity      Discipline Responsible: Recreational Therapist      Signature:  Lucie Nichols

## 2023-08-10 PROCEDURE — 6370000000 HC RX 637 (ALT 250 FOR IP)

## 2023-08-10 PROCEDURE — 99232 SBSQ HOSP IP/OBS MODERATE 35: CPT | Performed by: PSYCHIATRY & NEUROLOGY

## 2023-08-10 PROCEDURE — 1240000000 HC EMOTIONAL WELLNESS R&B

## 2023-08-10 PROCEDURE — 6370000000 HC RX 637 (ALT 250 FOR IP): Performed by: PSYCHIATRY & NEUROLOGY

## 2023-08-10 RX ADMIN — BUSPIRONE HYDROCHLORIDE 10 MG: 10 TABLET ORAL at 08:44

## 2023-08-10 RX ADMIN — PANTOPRAZOLE SODIUM 40 MG: 40 TABLET, DELAYED RELEASE ORAL at 08:44

## 2023-08-10 RX ADMIN — NICOTINE POLACRILEX 2 MG: 2 LOZENGE ORAL at 19:47

## 2023-08-10 RX ADMIN — NICOTINE POLACRILEX 2 MG: 2 LOZENGE ORAL at 15:10

## 2023-08-10 RX ADMIN — NICOTINE POLACRILEX 2 MG: 2 LOZENGE ORAL at 21:43

## 2023-08-10 RX ADMIN — Medication 5 MG: at 21:27

## 2023-08-10 RX ADMIN — MIRTAZAPINE 15 MG: 15 TABLET, FILM COATED ORAL at 21:27

## 2023-08-10 RX ADMIN — HYDROXYZINE HYDROCHLORIDE 50 MG: 25 TABLET, FILM COATED ORAL at 19:47

## 2023-08-10 RX ADMIN — BUSPIRONE HYDROCHLORIDE 10 MG: 10 TABLET ORAL at 21:27

## 2023-08-10 RX ADMIN — NICOTINE POLACRILEX 2 MG: 2 LOZENGE ORAL at 10:10

## 2023-08-10 RX ADMIN — METHADONE HYDROCHLORIDE 155 MG: 10 TABLET ORAL at 08:44

## 2023-08-10 RX ADMIN — PANTOPRAZOLE SODIUM 40 MG: 40 TABLET, DELAYED RELEASE ORAL at 15:10

## 2023-08-10 NOTE — GROUP NOTE
Group Therapy Note    Date: 8/10/2023    Group Start Time: 0900  Group End Time: 1000  Group Topic: Topic Group    [facility] [place] [facility] AC[facility]    [address]        Group Therapy Note    Attendees:        Patient's Goal:  To identify personal affirmations        Status After Intervention:  Improved    Participation Level: Interactive    Participation Quality: Appropriate, Attentive, and Sharing      Speech:  normal      Thought Process/Content: Logical      Affective Functioning: Congruent      Level of consciousness:  Oriented x4 and Attentive      Response to Learning: Progressing to goal      Endings: None Reported    Modes of Intervention: Education      Discipline Responsible: Recreational Therapist      Signature:  Mae Boyce

## 2023-08-10 NOTE — BH NOTE
This writer received phone call from patient's [address]. SW informed her that patient is still in the hospital and is very upset with the PO as he believes that PO is out to get him. She reports that his corupus is in but not in effect at this time. SW informed her that patient reports that he is contacting his . Patient reports that he is still not ready to discharge and understands that he could potentially have to discharge to snf.           Riri Faustin, supervisee in social work

## 2023-08-10 NOTE — BH NOTE
E/M Psychiatric Progress Note    Patient: Adelaida Garcia MRN: [id number]  SSN: [id number]    YOB: 1985  Age: 34 y.o. Sex: male      Admit Date: 8/4/2023    LOS: 5 days     Chief Complaint: suicidal ideation    Subjective:     HPI / INTERVAL HISTORY:    The patient, Adelaida Garcia, is a 34 y.o. Black / [place] American male with a past psychiatric history significant for opioid use disorder on ORT, who presents at this time with complaints of (and/or evidence of) the following emotional symptoms: depression and suicidal thoughts/threats. Additional symptomatology include escalation of polysubstance abuse. The above symptoms have been present for 24+ hours. These symptoms are of moderate to high severity. These symptoms are constant in nature. The patient's condition has been precipitated by psychosocial stressors. Patient's condition made worse by continued psychoactive drug use. UDS: +opiates; methadone; BAL=0. The patient was seen twice in the ED prior to admission. He endorsed anxiety and was sent home but then re-presented a few hours later and stated he did not feel safe on his own. Patient's wife accompanied him and corroborated his mental state as being off baseline. Patient reported that he had not used Fentanyl in weeks but acknowledges abusing this in addition to taking methadone (UDS positive for both agents). The patient is a fair historian. The patient corroborates the above narrative. The patient contracts for safety on the unit and gives consent for the team to contact collateral. The patient is amenable to initiating treatment while on the unit. Methadone dose verified via [facility]. 08/06 - no acute overnight events. The patient has been depressed, flat but cooperative. He c/o heartburn otherwise is doing well physically. He is tolerating medication thus far and slept 7 hours overnight. Patient got Mylanta x2 for heartburn with fair effect.  He

## 2023-08-10 NOTE — BH NOTE
E/M Psychiatric Progress Note    Patient: Elsa Suero MRN: [id number]  SSN: [id number]    YOB: 1985  Age: 34 y.o. Sex: male      Admit Date: 8/4/2023    LOS: 6 days     Chief Complaint: suicidal ideation    Subjective:     HPI / INTERVAL HISTORY:    The patient, Elsa Suero, is a 34 y.o. Black / [place] American male with a past psychiatric history significant for opioid use disorder on ORT, who presents at this time with complaints of (and/or evidence of) the following emotional symptoms: depression and suicidal thoughts/threats. Additional symptomatology include escalation of polysubstance abuse. The above symptoms have been present for 24+ hours. These symptoms are of moderate to high severity. These symptoms are constant in nature. The patient's condition has been precipitated by psychosocial stressors. Patient's condition made worse by continued psychoactive drug use. UDS: +opiates; methadone; BAL=0. The patient was seen twice in the ED prior to admission. He endorsed anxiety and was sent home but then re-presented a few hours later and stated he did not feel safe on his own. Patient's wife accompanied him and corroborated his mental state as being off baseline. Patient reported that he had not used Fentanyl in weeks but acknowledges abusing this in addition to taking methadone (UDS positive for both agents). The patient is a fair historian. The patient corroborates the above narrative. The patient contracts for safety on the unit and gives consent for the team to contact collateral. The patient is amenable to initiating treatment while on the unit. Methadone dose verified via [facility]. 08/06 - no acute overnight events. The patient has been depressed, flat but cooperative. He c/o heartburn otherwise is doing well physically. He is tolerating medication thus far and slept 7 hours overnight. Patient got Mylanta x2 for heartburn with fair effect.  He denies active thoughts of self harm and is able to make his needs known. 08/07 - the patient has been visible, medication compliant and with no episodes of agitation or aggression. He is sad and endorses a depressed mood but no active thoughts of self harm. Patient medication compliant, he is able to make his needs known and he has been tolerating medication thus far. Patient denies SI/HI/AVH/PI and is in fair behavioral control. Patient c/o ongoing heartburn but otherwise no physical/withdrawal complaints. 08/08 - no acute overnight events. Patient has been isolative but cooperative. Patient is medication compliant, able to make his needs known and with no instances of agitation or aggression. He denies active thoughts of self harm but continues to endorse depressed mood, stating he would likely return to the hospital if discharged as he has ongoing apprehension about his circumstances. He contracts for safety and is cooperative with assessments. Of note the patient had a  calling about the patient's whereabouts. No information was shared with the official.     08/09 - the patient slept 8 hours overnight though he states he had poor sleep. He remains anxious and is requesting additional agents to help with settling his thoughts. Patient counseled on the pending warrant that he is facing due to relapse. He voices understanding of the situation and is able to make his needs known. Patient denies SI/HI/AVH/PI. He is able to make his needs known and denies active thoughts of self harm. 08/10 - no acute overnight events. Patient has been visible, able to make his needs known and with no instances of agitation or thoughts of self harm. He is medication compliant, got Trazodone PRN last night wait fair effect and slept 6.5 hours overnight. Patient ambivalent about his pending legal issues, he voices anxiety but generally blames his PO for being hard on him.  He denies SI/HI/AVH/PI but

## 2023-08-10 NOTE — GROUP NOTE
Group Therapy Note    Date: 8/10/2023    Group Start Time: 1500  Group End Time: 1600  Group Topic: Recreational    [address] ACUTE BEHAV HLTH    [address]        Group Therapy Note    Attendees:        Patient's Goal:  To concentrate on selected task    Notes:   Active participant in game    Status After Intervention:  Improved    Participation Level: Interactive    Participation Quality: Appropriate, Attentive, and Sharing      Speech:  normal      Thought Process/Content: Logical      Affective Functioning: Congruent      Mood: euthymic      Level of consciousness:  Attentive      Response to Learning: Progressing to goal      Endings: None Reported    Modes of Intervention: Socialization and Activity      Discipline Responsible: Recreational Therapist      Signature:  Mae Boyce

## 2023-08-10 NOTE — BH NOTE
Patient alert, calm, cooperative. Denies SI/HI. Denies AVH. Denies pain. Denies depression but endorsed anxiety. Atarax given as needed with good effect. No distress observed. No concerns expressed. Visible in the community. Q15 minutes and hourly checks ongoing.

## 2023-08-10 NOTE — BH NOTE
Nurses Note;      1900 to 0700:      Report received from outgoing day shift RN. Assumed care of patient. On initial round Patient is in day room sitting quietly and watching TV.hallway. Patient is up., alert, oriented, and denies S/I, H/I, A/V/H, pain, depression, anxiety. Patient is compliant  with HS medications. Patient received PRN nicotine lozenge, and trazodone. Patient observed resting in bed during shift rounds. Continuously hourly rounds and q 15 minute checks maintained during shift for care and safety. Patient slept for 6.30 hrs.

## 2023-08-10 NOTE — BH NOTE
Behavioral Health Treatment Team Note     Patient goal(s) for today: Take medications as prescribed, engage in unit activities, continue taking meds as, participate in hygiene/ADLs, prepare for discharge, follow directions from staff,   Treatment team focus/goals: Patient will continue to maintain a calm and cooperative mood and communicate their needs with staff. Continue taking meds as prescribed, engage in unit activities, participate in hygiene/ADLs, prepare for discharge, follow directions from staff, contact support team, attend groups    Progress note: Patient has a flat affect with restless behavior. Patient is anxious. Patient is agitated about his PO, claiming she is \"evil\" and wanting to know why \"someone would do this to someone in treatment\". Patient has a corpus warrant for his release. SW to contact wife. LOS:  6  Expected LOS: 7    Insurance info/prescription coverage:  Aetna  Date of last family contact:  attempted today  Family requesting physician contact today:  No  Discharge plan:  Home  Guns in the home:  No  Outpatient provider(s):   To be linked     Participating treatment team members: Darcie Arango MSW, Jessika Terrell MD

## 2023-08-11 PROCEDURE — 6370000000 HC RX 637 (ALT 250 FOR IP): Performed by: PSYCHIATRY & NEUROLOGY

## 2023-08-11 PROCEDURE — 99232 SBSQ HOSP IP/OBS MODERATE 35: CPT | Performed by: PSYCHIATRY & NEUROLOGY

## 2023-08-11 PROCEDURE — 1240000000 HC EMOTIONAL WELLNESS R&B

## 2023-08-11 PROCEDURE — 6370000000 HC RX 637 (ALT 250 FOR IP)

## 2023-08-11 RX ADMIN — HYDROXYZINE HYDROCHLORIDE 50 MG: 25 TABLET, FILM COATED ORAL at 08:22

## 2023-08-11 RX ADMIN — Medication 5 MG: at 21:14

## 2023-08-11 RX ADMIN — PANTOPRAZOLE SODIUM 40 MG: 40 TABLET, DELAYED RELEASE ORAL at 16:22

## 2023-08-11 RX ADMIN — NICOTINE POLACRILEX 2 MG: 2 LOZENGE ORAL at 17:32

## 2023-08-11 RX ADMIN — METHADONE HYDROCHLORIDE 155 MG: 10 TABLET ORAL at 08:09

## 2023-08-11 RX ADMIN — NICOTINE POLACRILEX 2 MG: 2 LOZENGE ORAL at 21:14

## 2023-08-11 RX ADMIN — ACETAMINOPHEN 650 MG: 325 TABLET ORAL at 21:14

## 2023-08-11 RX ADMIN — MIRTAZAPINE 15 MG: 15 TABLET, FILM COATED ORAL at 21:14

## 2023-08-11 RX ADMIN — NICOTINE POLACRILEX 2 MG: 2 LOZENGE ORAL at 08:24

## 2023-08-11 RX ADMIN — BUSPIRONE HYDROCHLORIDE 10 MG: 10 TABLET ORAL at 08:10

## 2023-08-11 RX ADMIN — TRAZODONE HYDROCHLORIDE 50 MG: 50 TABLET ORAL at 21:14

## 2023-08-11 RX ADMIN — PANTOPRAZOLE SODIUM 40 MG: 40 TABLET, DELAYED RELEASE ORAL at 07:33

## 2023-08-11 RX ADMIN — BUSPIRONE HYDROCHLORIDE 10 MG: 10 TABLET ORAL at 21:14

## 2023-08-11 RX ADMIN — HYDROXYZINE HYDROCHLORIDE 50 MG: 25 TABLET, FILM COATED ORAL at 17:31

## 2023-08-11 ASSESSMENT — PAIN DESCRIPTION - LOCATION
LOCATION: GENERALIZED
LOCATION: NECK

## 2023-08-11 ASSESSMENT — PAIN DESCRIPTION - DESCRIPTORS
DESCRIPTORS: ACHING
DESCRIPTORS: ACHING

## 2023-08-11 ASSESSMENT — PAIN SCALES - GENERAL
PAINLEVEL_OUTOF10: 4
PAINLEVEL_OUTOF10: 5

## 2023-08-11 ASSESSMENT — PAIN DESCRIPTION - ORIENTATION: ORIENTATION: MID

## 2023-08-11 NOTE — BH NOTE
BAY reached out to patient's wife, Marnie Penn, [phone]. Wife had some concerns about discharge relating to anxiety medication and therapy. BAY told wife patient will be discharged with outpatient therapy and med management referral. Wife is agreeable to patient returning home. She will be able to transport home at 1 pm.     BAY called wife back. Wife reports that the  is working on the warrant. Wife is also reporting that she does not know \"why the PO is doing this\", and told the SW that he is having panic attacks when she discusses. The wife reports that the  says that the patient should stay in the hospital until \"he is stable\". The wife also reported that she does not think the patient is stable to leave. BAY will pass information on to the team.     Duncan Leslie is patient's .    NORA Anderson

## 2023-08-11 NOTE — DISCHARGE INSTRUCTIONS
DISCHARGE SUMMARY    NAME:Darnell Escalera  : 1985  MRN: [id number]    The patient Sugar Gibson exhibits the ability to control behavior in a less restrictive environment. Patient's level of functioning is improving. No assaultive/destructive behavior has been observed for the past 24 hours. No suicidal/homicidal threat or behavior has been observed for the past 24 hours. There is no evidence of serious medication side effects. Patient has not been in physical or protective restraints for at least the past 24 hours. If weapons involved, how are they secured? None    Is patient aware of and in agreement with discharge plan? Yes    Arrangements for medication:  Prescriptions sent    Copy of discharge instructions to provider?:  Yes    Arrangements for transportation home:  Family    Keep all follow up appointments as scheduled, continue to take prescribed medications per physician instructions. Mental health crisis number:  902 or your local mental health crisis line number at [phone].        Mental Health Emergency WARM LINE      [phone] ([phone])      M-F: 9am to 9pm      Sat & Sun: 5pm - 9pm  National suicide prevention lines:                             0-860-VWLHIAQ ([phone])       5-237-160-TALK ([phone])    Crisis Text Line:  Text HOME to 136902

## 2023-08-11 NOTE — CARE COORDINATION
08/11/23 0907   ITP   Date of Plan 08/11/23   Primary Diagnosis Code Adjustment Disorder   Short Term Goal 1   STG Goal 1 Status: Patient Appears to be    (Goal met, discharging)   Short Term Goal 2   STG Goal 2 Status: Patient Appears to be    (Goal met, discharging)   Crisis/Safety/Discharge Plan   Discharge [facility]

## 2023-08-11 NOTE — GROUP NOTE
Group Therapy Note    Date: 8/11/2023    Group Start Time: 9036  Group End Time: 5003  Group Topic: Community Meeting    [address] ACUTE BEHAV [address] [facility] [address]; [facility]        Group Therapy Note    Attendees: 5       Patient's Goal:  get work done for his job     Notes:  3/10    Status After Intervention:  Improved    Participation Level:  Active Listener and Interactive    Participation Quality: Appropriate, Attentive, Sharing, and Supportive      Speech:  normal      Thought Process/Content: Logical      Affective Functioning: Congruent      Mood: anxious      Level of consciousness:  Alert      Response to Learning: Able to verbalize current knowledge/experience, Able to verbalize/acknowledge new learning, and Able to retain information      Endings: None Reported    Modes of Intervention: Education      Discipline Responsible: Gabrielle      Signature:  Zion Teresa

## 2023-08-11 NOTE — BH NOTE
Morning assessment complete, patient remains alert and oriented x 4. No violence or aggression to note. He denies SI/HI, AVH. Pt also denies all depression and anxiety. He interacts well but minimally with staff and peers. Pt was encouraged to come out into the dayroom. Pt is meal compliant at this time. There are no other issues to note at this time.

## 2023-08-11 NOTE — PLAN OF CARE
This writer met with pt in day room. Affect flat, mood irritable, eye contact appropriate. A&Ox4. Observed sitting calming watching TV with peers. Denies SI/HI/AVH. Pt voiced being upset and anxious r/t \"mixed messages\" regarding use of his tablet. When pt asked to use tablet tonight he was informed that night shift did not have staff to monitor him and he would have to wait until day shift. Pt stated that day shift told him to wait for night shift. Pt informed that, per CCL, pt will be able to use tablet to do work during the day shift tomorrow when more staff are available, which pt was agreeable to. Pt given PRN Hydroxyzine for reported anxiety with good effect. Compliant with scheduled medication. Problem: Decision Making  Goal: Pt/Family able to effectively weigh alternatives and participate in decision making related to treatment and care  Description: INTERVENTIONS:  1. Determine when there are differences between patient's view, family's view, and healthcare provider's view of condition  2. Facilitate patient and family articulation of goals for care  3. Help patient and family identify pros/cons of alternative solutions  4. Provide information as requested by patient/family  5. Respect patient/family right to receive or not to receive information  6. Serve as a liaison between patient and family and health care team  7.  Initiate Consults from Ethics, Palliative Care or initiate [phone] as is appropriate  Outcome: Progressing

## 2023-08-11 NOTE — BH NOTE
Behavioral Health Treatment Team Note     Patient goal(s) for today: Take medications as prescribed, engage in unit activities, continue taking meds as, participate in hygiene/ADLs, prepare for discharge, follow directions from staff,   Treatment team focus/goals: Patient will continue to maintain a calm and cooperative mood and communicate their needs with staff. Continue taking meds as prescribed, engage in unit activities, participate in hygiene/ADLs, prepare for discharge, follow directions from staff, contact support team, attend groups    Progress note: Patient is anxious and depressed. He is expressing concern about discharge. He endorses negative thoughts. Patient will most likely be arrested after discharge. Patient is understanding of this and has been speaking about it with his . SW to speak to wife. LOS:  7  Expected LOS: TBD    Insurance info/prescription coverage:  Aetna  Date of last family contact:  attempted today  Family requesting physician contact today:  No  Discharge plan:  Home  Guns in the home:  No  Outpatient provider(s):   To be linked     Participating treatment team members: Brennen Santa, NORA Moeller, Raghavendra Morris MD

## 2023-08-11 NOTE — PROGRESS NOTES
MUSIC THERAPY GROUP PROGRESS NOTE        8/11/2023    The patient Stan claudio 34 y.o. male participated in Music Therapy group on [address] [place] [address]. Group time: 11:05-11:50    Personal goal for participation: Patient (pt) will actively participate in at least one-third of the group session time. Goal orientation:  Personal; Community    Group therapy participation: active    Therapeutic interventions: Drumming; Mindfulness; Community building; Emotional identification & expression    Observation of participation: The patient (pt) participated actively during the entirety of the music therapy group session. Pt engaged in following the instructions offered by this music therapist (MT), as well as introduced himself and his music preferences during group introductions. Pt also participated in filling out a pre-session survey provided by this MT. As MT transitioned into the first music therapy experience (Mindfulness), pt increased relaxation and emotional expression as evidenced by (AEB) becoming tearful as he closed his eyes and slowed his breathing down. As this experience concluded, MT transitioned into the main session activity (Drumming your Emotions). Pt drummed patterns onto the hand drum that reflected his emotions/emotional experience, and smiled/laughed at times when offered the drum. Pt also engaged in conversation with other group members, and participated in the music-making Venetie IRA by playing an egg shaker. As this MT concluded the session, pt completed the music therapy survey and returned the this before exiting the space for lunch.      Anamaria Mims MT-BC (Music Therapist, Board Certified)  [facility]

## 2023-08-11 NOTE — BH NOTE
E/M Psychiatric Progress Note    Patient: Tashia Martínez MRN: [id number]  SSN: [id number]    YOB: 1985  Age: 34 y.o. Sex: male      Admit Date: 8/4/2023    LOS: 7 days     Chief Complaint: suicidal ideation    Subjective:     HPI / INTERVAL HISTORY:    The patient, Tashia Martínez, is a 34 y.o. Black / [place] American male with a past psychiatric history significant for opioid use disorder on ORT, who presents at this time with complaints of (and/or evidence of) the following emotional symptoms: depression and suicidal thoughts/threats. Additional symptomatology include escalation of polysubstance abuse. The above symptoms have been present for 24+ hours. These symptoms are of moderate to high severity. These symptoms are constant in nature. The patient's condition has been precipitated by psychosocial stressors. Patient's condition made worse by continued psychoactive drug use. UDS: +opiates; methadone; BAL=0. The patient was seen twice in the ED prior to admission. He endorsed anxiety and was sent home but then re-presented a few hours later and stated he did not feel safe on his own. Patient's wife accompanied him and corroborated his mental state as being off baseline. Patient reported that he had not used Fentanyl in weeks but acknowledges abusing this in addition to taking methadone (UDS positive for both agents). The patient is a fair historian. The patient corroborates the above narrative. The patient contracts for safety on the unit and gives consent for the team to contact collateral. The patient is amenable to initiating treatment while on the unit. Methadone dose verified via [facility]. 08/06 - no acute overnight events. The patient has been depressed, flat but cooperative. He c/o heartburn otherwise is doing well physically. He is tolerating medication thus far and slept 7 hours overnight. Patient got Mylanta x2 for heartburn with fair effect.  He Oral BID AC    methadone  155 mg Oral Daily        LAB RESULTS: (reviewed/updated 8/11/2023)  Admission on 08/04/2023   Component Date Value Ref Range Status    Amphetamine, Urine 08/04/2023 Negative  NEG   Final    Barbiturates, Urine 08/04/2023 Negative  NEG   Final    Benzodiazepines, Urine 08/04/2023 Negative  NEG   Final    Cocaine, Urine 08/04/2023 Negative  NEG   Final    Methadone, Urine 08/04/2023 Positive (A)  NEG   Final    Opiates, Urine 08/04/2023 Positive (A)  NEG   Final    PCP, Urine 08/04/2023 Negative  NEG   Final    THC, TH-Cannabinol, Urine 08/04/2023 Negative  NEG   Final    Comments: 08/04/2023 (NOTE)   Final    Ethanol Lvl 08/04/2023 <10  <10 MG/DL Final         Assessment & Plan     The patient, Brian Andrews, is a 34 y.o.  male who presents at this time for treatment of the following diagnoses:  Adjustment disorder with depressed mood  ASSESSMENT: the patient presents with worsening anxiety and suicidal ideation in the setting of recent opioid use. Will detox, start an antidepressant with PRN anxiolytics as needed. Patient recently violated his probation and is in legal jeopardy once he is discharged; team urging patient to speak with his wife and  to  him on his options.    PLAN:  - Observe off substances  - CONTINUE Remeron 15 mg QHS for MDD  - CONTINUE Buspar 10 mg BID for anxiety  - CONTINUE Melatonin 5 mg QHS for insomnia  - CONTINUE Methadone 155 mg QDAY for opioid use disorder  - CONTINUE Pantoprazole 40 mg BIDAC for GERD  - Patient may use laptop for work on unit with supervision  - IGM therapy as tolerated  - Expand database / obtain collateral  - Dispo planning (home when stable)    I certify that this patient's inpatient psychiatric hospital services furnished since the previous certification were, and continue to be,required for treatment that could reasonably be expected to improve the patient's condition, or for diagnostic study, and that the patient continues to

## 2023-08-11 NOTE — CARE COORDINATION
08/11/23 0901   ITP   Date of Plan 08/07/23   Primary Diagnosis Code Adjustment Disorder   Barriers to Treatment Need for psychoeducation   [employer] in [address] [place] [address] [address] Term Goal (LTG) Stated in patient/guardian terms Patient to return home   Short Term Goal 1   Short Term Goal 1 Client will learn and demonstrate effective coping skills   Objectives Client will participate in group therapy   Intervention 1 Group therapy   Frequency Daily   Measured by Staff observation;Self report   Staff Responsible Clinical staff;[facility] staff   STG Goal 1 Status: Patient Appears to be  Progressing toward treatment plan goal  (Goal met, discharging)   Short Term Goal 2   Short Term Goal 2 Client will learn and demonstrate anxiety management skills   Baseline Functioning Patient does not have effective anxiety management skills   Target Patient will develop effective anxiety management skills   Objectives Client will participate in group therapy   Intervention 1 Group therapy; Acknowledge client strengths;Milieu therapy and support;Monitor medications   STG Goal 2 Status: Patient Appears to be  Progressing toward treatment plan goal   Crisis/Safety/Discharge Plan   Crisis/Safety Plan Standard program interventions and protocol   Comprehensive Assessment Completion Date 08/07/23   Discharge [facility]

## 2023-08-11 NOTE — BH NOTE
[id number]  Assumed care of the patient. Patient appeared to be sleeping during initial nursing round. Patient not in distress. Will continue  q 15 safety rounds. Patient appeared to be sleeping for about 5 hours.

## 2023-08-11 NOTE — BH NOTE
Group Therapy Note    Date: 8/11/2023  Start Time: 2:15  End Time:  3:00  Number of Participants: 5    Type of Group: Psychotherapy    Topic: Depression    Group facilitator discussed symptoms of a depressive episode with group participants. Group facilitator discussed the following symptoms: depressed mood, loss of energy, significant weight change, diminished concentration, recurring thoughts of death, and sleep difficulties. Group facilitator then discussed demographics and risk factors for this diagnosis. Group facilitator concluded group with ways to manage depression which included medications and therapy. Group members were able to remain engaged while sharing their individual experiences with depression. Group members this afternoon were observed to be fatigued. Patient was able to serve as an active participant during group, he mostly journaled but answered when prompted. He continues to show progress towards his goals by being an active member of group.        Signature:  Maren Unger

## 2023-08-12 ENCOUNTER — APPOINTMENT (OUTPATIENT)
Facility: HOSPITAL | Age: 38
DRG: 881 | End: 2023-08-12
Payer: COMMERCIAL

## 2023-08-12 PROCEDURE — 6370000000 HC RX 637 (ALT 250 FOR IP): Performed by: PSYCHIATRY & NEUROLOGY

## 2023-08-12 PROCEDURE — 6370000000 HC RX 637 (ALT 250 FOR IP)

## 2023-08-12 PROCEDURE — 1240000000 HC EMOTIONAL WELLNESS R&B

## 2023-08-12 PROCEDURE — 70490 CT SOFT TISSUE NECK W/O DYE: CPT

## 2023-08-12 PROCEDURE — 6370000000 HC RX 637 (ALT 250 FOR IP): Performed by: INTERNAL MEDICINE

## 2023-08-12 RX ORDER — AMOXICILLIN AND CLAVULANATE POTASSIUM 875; 125 MG/1; MG/1
1 TABLET, FILM COATED ORAL 2 TIMES DAILY WITH MEALS
Status: DISCONTINUED | OUTPATIENT
Start: 2023-08-12 | End: 2023-08-16 | Stop reason: HOSPADM

## 2023-08-12 RX ADMIN — NICOTINE POLACRILEX 2 MG: 2 LOZENGE ORAL at 20:54

## 2023-08-12 RX ADMIN — BUSPIRONE HYDROCHLORIDE 10 MG: 10 TABLET ORAL at 09:05

## 2023-08-12 RX ADMIN — Medication 5 MG: at 20:43

## 2023-08-12 RX ADMIN — MIRTAZAPINE 15 MG: 15 TABLET, FILM COATED ORAL at 20:43

## 2023-08-12 RX ADMIN — HYDROXYZINE HYDROCHLORIDE 50 MG: 25 TABLET, FILM COATED ORAL at 20:43

## 2023-08-12 RX ADMIN — AMOXICILLIN AND CLAVULANATE POTASSIUM 1 TABLET: 875; 125 TABLET, FILM COATED ORAL at 16:17

## 2023-08-12 RX ADMIN — HYDROXYZINE HYDROCHLORIDE 50 MG: 25 TABLET, FILM COATED ORAL at 04:07

## 2023-08-12 RX ADMIN — TRAZODONE HYDROCHLORIDE 50 MG: 50 TABLET ORAL at 20:43

## 2023-08-12 RX ADMIN — NICOTINE POLACRILEX 2 MG: 2 LOZENGE ORAL at 04:09

## 2023-08-12 RX ADMIN — PANTOPRAZOLE SODIUM 40 MG: 40 TABLET, DELAYED RELEASE ORAL at 16:17

## 2023-08-12 RX ADMIN — METHADONE HYDROCHLORIDE 155 MG: 10 TABLET ORAL at 09:04

## 2023-08-12 RX ADMIN — BUSPIRONE HYDROCHLORIDE 10 MG: 10 TABLET ORAL at 20:43

## 2023-08-12 RX ADMIN — PANTOPRAZOLE SODIUM 40 MG: 40 TABLET, DELAYED RELEASE ORAL at 06:27

## 2023-08-12 RX ADMIN — NICOTINE POLACRILEX 2 MG: 2 LOZENGE ORAL at 16:18

## 2023-08-12 ASSESSMENT — PAIN SCALES - GENERAL: PAINLEVEL_OUTOF10: 0

## 2023-08-12 NOTE — PLAN OF CARE
Patient met sitting in dayroom during initial assessment. Patient presented as calm/cooperative, A/O X4. Patient denied any immediate concerns. Patient reported symptoms of anxiety and depression 6/10. Patient denied hallucinations, S/I or H/I. Patient reported neck/tooth pain 5/10. Patient received all HS medications along with PRN Tylenol. Patient slept for 7.5 hours. Problem: Safety - Adult  Goal: Free from fall injury  Outcome: Progressing     Problem: Anxiety  Goal: Will report anxiety at manageable levels  Description: INTERVENTIONS:  1. Administer medication as ordered  2. Teach and rehearse alternative coping skills  3.  Provide emotional support with 1:1 interaction with staff  Outcome: Progressing

## 2023-08-12 NOTE — PLAN OF CARE
Problem: Safety - Adult  Goal: Free from fall injury  8/12/2023 0951 by Talia Quispe RN  Outcome: Progressing  8/12/2023 0500 by Vincent Mayer RN  Outcome: Progressing     Problem: Anxiety  Goal: Will report anxiety at manageable levels  Description: INTERVENTIONS:  1. Administer medication as ordered  2. Teach and rehearse alternative coping skills  3. Provide emotional support with 1:1 interaction with staff  8/12/2023 0951 by Talia Quispe RN  Outcome: Progressing  8/12/2023 0500 by Vincent Mayer RN  Outcome: Progressing     Problem: Coping  Goal: Pt/Family able to verbalize concerns and demonstrate effective coping strategies  Description: INTERVENTIONS:  1. Assist patient/family to identify coping skills, available support systems and cultural and spiritual values  2. Provide emotional support, including active listening and acknowledgement of concerns of patient and caregivers  3. Reduce environmental stimuli, as able  4. Instruct patient/family in relaxation techniques, as appropriate  5.  Assess for spiritual pain/suffering and initiate Spiritual Care, Psychosocial Clinical Specialist consults as needed  Outcome: Progressing

## 2023-08-12 NOTE — PROGRESS NOTES
Morning assessment complete. Patient was encountered sitting in the day room   He is calm and cooperative, pleasant. Patient reports that they are currently experiencing feelings of depression and anxiety. Patient has confirmed pain rated 0/10. Eye contact is noted to be good. Patient reports that last nights sleep was good. Mood is noted to be depressed and anxious. Affect is constricted. Patient has been out in the milieu interacting well with both peers and staff. VS are stable. Patient currently reports that there are no signs or symptoms of depression or anxiety,also denies all SI/HI, AVH. Patient is both med and meal compliant. There are no untoward side effects from medications to note. C-SSRS level is no risk. Patient complaining that he heard a pop in his jaw. It has caused the right side of his neck to be slightly swollen. He also has some pain under his tongue. Dr. Ramirez Clayton consulted. Patient was taken down for a CT scan. Dr. Ramirez Clayton prescribed antibiotics, warm compresses and anything that will make his mouth salivate. Hourly rounds are being completed to assure patient safety and attend to care needs.  Monitoring will continue for changes in patient condition

## 2023-08-12 NOTE — CONSULTS
Hospitalist Consultation Note    NAME: Angelina Rod   :  1985   MRN:  [id number]   Room Number: 404/20  @ [facility]     Date/Time:  2023 2:29 PM    Patient PCP: Jannis Boast, MD  ______________________________________________________________________      My assessment of this patient's clinical condition and my plan of care is as follows. Assessment / Plan:       Principal Problem:    Adjustment disorder with depressed mood  Active Problems:    Anxiety    Opioid use disorder, moderate, dependence (HCC)    PTSD (post-traumatic stress disorder)  Resolved Problems:    * No resolved hospital problems. *    Right Submandibular Sialadenitis:  CT neck showed sialadenitis. No sialolith. Recommend:  Augmentin 5 days  Warm compresses. Maintain good oral hygiene  Can use lemon drops. If pt does not improve in next 24-48 hours, recommend ENT consultation. Will sign off. Subjective:   REQUESTING PHYSICIAN: Psychiatrist  REASON FOR CONSULT : Neck pain    HISTORY OF PRESENT ILLNESS:     Angelina Rod is a 34 y.o.   male who heard a popping sound yesterday and started having right-sided jaw/neck pain  Patient denies any pain while moving head side-to-side or upside down. Denies fever,chills,chest pain, sob,abd pan,diarrhea,constipation,lighhadedness. History reviewed    No past surgical history on file. Social History     Tobacco Use    Smoking status: Every Day    Smokeless tobacco: Not on file   Substance Use Topics    Alcohol use: Yes        No family history on file. No Known Allergies     Prior to Admission medications    Medication Sig Start Date End Date Taking?  Authorizing Provider   METHADONE HCL PO Take 155 mg by mouth daily Dose verified by Jenn Pierre [phone] Max Daily Amount: 155 mg   Yes Historical Provider, MD   famotidine (PEPCID) 20 MG tablet Take 1 tablet by mouth 2 times daily 23  Yes Historical Provider, MD   losartan (COZAAR)

## 2023-08-13 PROCEDURE — 6370000000 HC RX 637 (ALT 250 FOR IP)

## 2023-08-13 PROCEDURE — 6370000000 HC RX 637 (ALT 250 FOR IP): Performed by: PSYCHIATRY & NEUROLOGY

## 2023-08-13 PROCEDURE — 6370000000 HC RX 637 (ALT 250 FOR IP): Performed by: INTERNAL MEDICINE

## 2023-08-13 PROCEDURE — 1240000000 HC EMOTIONAL WELLNESS R&B

## 2023-08-13 RX ADMIN — HYDROXYZINE HYDROCHLORIDE 50 MG: 25 TABLET, FILM COATED ORAL at 17:05

## 2023-08-13 RX ADMIN — MIRTAZAPINE 15 MG: 15 TABLET, FILM COATED ORAL at 20:39

## 2023-08-13 RX ADMIN — NICOTINE POLACRILEX 2 MG: 2 LOZENGE ORAL at 10:45

## 2023-08-13 RX ADMIN — BUSPIRONE HYDROCHLORIDE 10 MG: 10 TABLET ORAL at 08:23

## 2023-08-13 RX ADMIN — TRAZODONE HYDROCHLORIDE 50 MG: 50 TABLET ORAL at 20:38

## 2023-08-13 RX ADMIN — PANTOPRAZOLE SODIUM 40 MG: 40 TABLET, DELAYED RELEASE ORAL at 06:23

## 2023-08-13 RX ADMIN — BUSPIRONE HYDROCHLORIDE 10 MG: 10 TABLET ORAL at 20:38

## 2023-08-13 RX ADMIN — Medication 5 MG: at 20:41

## 2023-08-13 RX ADMIN — NICOTINE POLACRILEX 2 MG: 2 LOZENGE ORAL at 20:38

## 2023-08-13 RX ADMIN — HYDROXYZINE HYDROCHLORIDE 50 MG: 25 TABLET, FILM COATED ORAL at 08:31

## 2023-08-13 RX ADMIN — METHADONE HYDROCHLORIDE 155 MG: 10 TABLET ORAL at 08:22

## 2023-08-13 RX ADMIN — PANTOPRAZOLE SODIUM 40 MG: 40 TABLET, DELAYED RELEASE ORAL at 17:02

## 2023-08-13 RX ADMIN — NICOTINE POLACRILEX 2 MG: 2 LOZENGE ORAL at 17:06

## 2023-08-13 RX ADMIN — AMOXICILLIN AND CLAVULANATE POTASSIUM 1 TABLET: 875; 125 TABLET, FILM COATED ORAL at 17:02

## 2023-08-13 RX ADMIN — AMOXICILLIN AND CLAVULANATE POTASSIUM 1 TABLET: 875; 125 TABLET, FILM COATED ORAL at 08:23

## 2023-08-13 ASSESSMENT — PAIN SCALES - GENERAL: PAINLEVEL_OUTOF10: 0

## 2023-08-13 NOTE — PLAN OF CARE
Problem: Safety - Adult  Goal: Free from fall injury  8/12/2023 0951 by Mary Grace Doyle RN  Outcome: Progressing     Problem: Anxiety  Goal: Will report anxiety at manageable levels  Description: INTERVENTIONS:  1. Administer medication as ordered  2. Teach and rehearse alternative coping skills  3. Provide emotional support with 1:1 interaction with staff  8/12/2023 2332 by Rhoda Crum RN  Outcome: Progressing  8/12/2023 0951 by Mary Grace Doyle RN  Outcome: Progressing     Problem: Coping  Goal: Pt/Family able to verbalize concerns and demonstrate effective coping strategies  Description: INTERVENTIONS:  1. Assist patient/family to identify coping skills, available support systems and cultural and spiritual values  2. Provide emotional support, including active listening and acknowledgement of concerns of patient and caregivers  3. Reduce environmental stimuli, as able  4. Instruct patient/family in relaxation techniques, as appropriate  5. Assess for spiritual pain/suffering and initiate Spiritual Care, Psychosocial Clinical Specialist consults as needed  8/12/2023 2332 by Rhoda Crum RN  Outcome: Progressing  8/12/2023 0951 by Mary Grace Doyle RN  Outcome: Progressing     Problem: Depression/Self Harm  Goal: Effect of psychiatric condition will be minimized and patient will be protected from self harm  Description: INTERVENTIONS:  1. Assess impact of patient's symptoms on level of functioning, self care needs and offer support as indicated  2. Assess patient/family knowledge of depression, impact on illness and need for teaching  3. Provide emotional support, presence and reassurance  4. Assess for possible suicidal thoughts or ideation. If patient expresses suicidal thoughts or statements do not leave alone, initiate Suicide Precautions, move to a room close to the nursing station and obtain sitter  5.  Initiate consults as appropriate with Mental Health Professional, Spiritual Care,

## 2023-08-13 NOTE — BH NOTE
E/M Psychiatric Progress Note    Patient: Gerri Benítez MRN: [id number]  SSN: [id number]    YOB: 1985  Age: 34 y.o. Sex: male      Admit Date: 8/4/2023    LOS: 9 days     Chief Complaint: suicidal ideation    Subjective:     HPI / INTERVAL HISTORY:    The patient, Gerri Benítez, is a 34 y.o. Black / [place] American male with a past psychiatric history significant for opioid use disorder on ORT, who presents at this time with complaints of (and/or evidence of) the following emotional symptoms: depression and suicidal thoughts/threats. Additional symptomatology include escalation of polysubstance abuse. The above symptoms have been present for 24+ hours. These symptoms are of moderate to high severity. These symptoms are constant in nature. The patient's condition has been precipitated by psychosocial stressors. Patient's condition made worse by continued psychoactive drug use. UDS: +opiates; methadone; BAL=0. The patient was seen twice in the ED prior to admission. He endorsed anxiety and was sent home but then re-presented a few hours later and stated he did not feel safe on his own. Patient's wife accompanied him and corroborated his mental state as being off baseline. Patient reported that he had not used Fentanyl in weeks but acknowledges abusing this in addition to taking methadone (UDS positive for both agents). The patient is a fair historian. The patient corroborates the above narrative. The patient contracts for safety on the unit and gives consent for the team to contact collateral. The patient is amenable to initiating treatment while on the unit. Methadone dose verified via [facility]. 08/06 - no acute overnight events. The patient has been depressed, flat but cooperative. He c/o heartburn otherwise is doing well physically. He is tolerating medication thus far and slept 7 hours overnight. Patient got Mylanta x2 for heartburn with fair effect.  He supervision  - IGM therapy as tolerated  - Expand database / obtain collateral  - Dispo planning (home when stable)    I certify that this patient's inpatient psychiatric hospital services furnished since the previous certification were, and continue to be,required for treatment that could reasonably be expected to improve the patient's condition, or for diagnostic study, and that the patient continues to need, on a daily basis, active treatment furnished directly by or requiring the supervision of inpatient psychiatric facility personnel. In addition, the hospital records show that services furnished were intensive treatment services, admission or related services, or equivalent services.     Signed By: Lee Davis MD     August 13, 2023

## 2023-08-13 NOTE — BH NOTE
Met pt in the dayroom watching TV, alert and oriented x 4. Presents calm and cooperative with constricted affect and sad mood. Pt was seen interacting appropriately with peers. Vital signs checked was within normal range. On assessment, Pt endorses anxiety of 8/10, depression of 7/10, denies SI/HI/AH/VH and pain. Scheduled med, PRN Atarax ,Trazodone and Nicotine Lozenges was given. Q 15 min's rounding is ongoing for safety. Pt had a calm shift with no behavioral issues. Pt slept for 7.15 hours.

## 2023-08-13 NOTE — BH NOTE
Morning assessment complete. Patient was encountered in the hallway of the unit. Patient is alert and oriented x 4. He is calm and cooperative interacts well with both peers and staff. Patient reports that they are currently experiencing feelings of both depression and anxiety. Patient denies all SI/HI, AVH. Concerns about being able to complete his work on his tablet doing quiet hours. Patient was assured that he would be able to use his device per order. Patient denies pain    Eye contact is noted to be good. Patient reports that last nights sleep was restful. Mood is noted to be depressed and anxious Affect is constricted   VS are stable. Patient is both med and meal compliant, with no untoward side effects from medications to note  Patient reports that they have set a goal to accomplish    C-SSRS level is no risk     Patient was given education on antibiotics that were started for a salivary gland infection. He voiced understanding  Hourly rounds are being completed to assure patient safety and attend to care needs.  Monitoring will continue for changes in patient condition

## 2023-08-13 NOTE — BH NOTE
E/M Psychiatric Progress Note    Patient: Abhi Bello MRN: [id number]  SSN: [id number]    YOB: 1985  Age: 34 y.o. Sex: male      Admit Date: 8/4/2023    LOS: 9 days     Chief Complaint: suicidal ideation    Subjective:     HPI / INTERVAL HISTORY:    The patient, Abhi Bello, is a 34 y.o. Black / [place] American male with a past psychiatric history significant for opioid use disorder on ORT, who presents at this time with complaints of (and/or evidence of) the following emotional symptoms: depression and suicidal thoughts/threats. Additional symptomatology include escalation of polysubstance abuse. The above symptoms have been present for 24+ hours. These symptoms are of moderate to high severity. These symptoms are constant in nature. The patient's condition has been precipitated by psychosocial stressors. Patient's condition made worse by continued psychoactive drug use. UDS: +opiates; methadone; BAL=0. The patient was seen twice in the ED prior to admission. He endorsed anxiety and was sent home but then re-presented a few hours later and stated he did not feel safe on his own. Patient's wife accompanied him and corroborated his mental state as being off baseline. Patient reported that he had not used Fentanyl in weeks but acknowledges abusing this in addition to taking methadone (UDS positive for both agents). The patient is a fair historian. The patient corroborates the above narrative. The patient contracts for safety on the unit and gives consent for the team to contact collateral. The patient is amenable to initiating treatment while on the unit. Methadone dose verified via [facility]. 08/06 - no acute overnight events. The patient has been depressed, flat but cooperative. He c/o heartburn otherwise is doing well physically. He is tolerating medication thus far and slept 7 hours overnight. Patient got Mylanta x2 for heartburn with fair effect.  He denies active thoughts of self harm and is able to make his needs known. 08/07 - the patient has been visible, medication compliant and with no episodes of agitation or aggression. He is sad and endorses a depressed mood but no active thoughts of self harm. Patient medication compliant, he is able to make his needs known and he has been tolerating medication thus far. Patient denies SI/HI/AVH/PI and is in fair behavioral control. Patient c/o ongoing heartburn but otherwise no physical/withdrawal complaints. 08/08 - no acute overnight events. Patient has been isolative but cooperative. Patient is medication compliant, able to make his needs known and with no instances of agitation or aggression. He denies active thoughts of self harm but continues to endorse depressed mood, stating he would likely return to the hospital if discharged as he has ongoing apprehension about his circumstances. He contracts for safety and is cooperative with assessments. Of note the patient had a  calling about the patient's whereabouts. No information was shared with the official.     08/09 - the patient slept 8 hours overnight though he states he had poor sleep. He remains anxious and is requesting additional agents to help with settling his thoughts. Patient counseled on the pending warrant that he is facing due to relapse. He voices understanding of the situation and is able to make his needs known. Patient denies SI/HI/AVH/PI. He is able to make his needs known and denies active thoughts of self harm. 08/10 - no acute overnight events. Patient has been visible, able to make his needs known and with no instances of agitation or thoughts of self harm. He is medication compliant, got Trazodone PRN last night wait fair effect and slept 6.5 hours overnight. Patient ambivalent about his pending legal issues, he voices anxiety but generally blames his PO for being hard on him.  He denies SI/HI/AVH/PI but

## 2023-08-14 PROCEDURE — 1240000000 HC EMOTIONAL WELLNESS R&B

## 2023-08-14 PROCEDURE — 99233 SBSQ HOSP IP/OBS HIGH 50: CPT | Performed by: PSYCHIATRY & NEUROLOGY

## 2023-08-14 PROCEDURE — 6370000000 HC RX 637 (ALT 250 FOR IP)

## 2023-08-14 PROCEDURE — 6370000000 HC RX 637 (ALT 250 FOR IP): Performed by: INTERNAL MEDICINE

## 2023-08-14 PROCEDURE — 6370000000 HC RX 637 (ALT 250 FOR IP): Performed by: PSYCHIATRY & NEUROLOGY

## 2023-08-14 RX ORDER — LOSARTAN POTASSIUM 25 MG/1
50 TABLET ORAL DAILY
Status: DISCONTINUED | OUTPATIENT
Start: 2023-08-15 | End: 2023-08-16 | Stop reason: HOSPADM

## 2023-08-14 RX ADMIN — PANTOPRAZOLE SODIUM 40 MG: 40 TABLET, DELAYED RELEASE ORAL at 06:40

## 2023-08-14 RX ADMIN — NICOTINE POLACRILEX 2 MG: 2 LOZENGE ORAL at 17:15

## 2023-08-14 RX ADMIN — AMOXICILLIN AND CLAVULANATE POTASSIUM 1 TABLET: 875; 125 TABLET, FILM COATED ORAL at 08:48

## 2023-08-14 RX ADMIN — Medication 5 MG: at 22:15

## 2023-08-14 RX ADMIN — AMOXICILLIN AND CLAVULANATE POTASSIUM 1 TABLET: 875; 125 TABLET, FILM COATED ORAL at 16:45

## 2023-08-14 RX ADMIN — HYDROXYZINE HYDROCHLORIDE 50 MG: 25 TABLET, FILM COATED ORAL at 06:40

## 2023-08-14 RX ADMIN — BUSPIRONE HYDROCHLORIDE 10 MG: 10 TABLET ORAL at 08:48

## 2023-08-14 RX ADMIN — HYDROXYZINE HYDROCHLORIDE 50 MG: 25 TABLET, FILM COATED ORAL at 22:14

## 2023-08-14 RX ADMIN — TRAZODONE HYDROCHLORIDE 50 MG: 50 TABLET ORAL at 22:14

## 2023-08-14 RX ADMIN — METHADONE HYDROCHLORIDE 155 MG: 10 TABLET ORAL at 08:48

## 2023-08-14 RX ADMIN — BUSPIRONE HYDROCHLORIDE 15 MG: 10 TABLET ORAL at 22:14

## 2023-08-14 RX ADMIN — NICOTINE POLACRILEX 2 MG: 2 LOZENGE ORAL at 22:15

## 2023-08-14 RX ADMIN — HYDROXYZINE HYDROCHLORIDE 50 MG: 25 TABLET, FILM COATED ORAL at 14:18

## 2023-08-14 RX ADMIN — MIRTAZAPINE 15 MG: 15 TABLET, FILM COATED ORAL at 22:15

## 2023-08-14 RX ADMIN — PANTOPRAZOLE SODIUM 40 MG: 40 TABLET, DELAYED RELEASE ORAL at 16:45

## 2023-08-14 ASSESSMENT — PAIN SCALES - GENERAL
PAINLEVEL_OUTOF10: 0
PAINLEVEL_OUTOF10: 0

## 2023-08-14 NOTE — BH NOTE
E/M Psychiatric Progress Note    Patient: Brian Andrews MRN: [id number]  SSN: [id number]    YOB: 1985  Age: 34 y.o. Sex: male      Admit Date: 8/4/2023    LOS: 10 days     Chief Complaint: suicidal ideation    Subjective:     HPI / INTERVAL HISTORY:    The patient, Brian Andrews, is a 34 y.o. Black / [place] American male with a past psychiatric history significant for opioid use disorder on ORT, who presents at this time with complaints of (and/or evidence of) the following emotional symptoms: depression and suicidal thoughts/threats. Additional symptomatology include escalation of polysubstance abuse. The above symptoms have been present for 24+ hours. These symptoms are of moderate to high severity. These symptoms are constant in nature. The patient's condition has been precipitated by psychosocial stressors. Patient's condition made worse by continued psychoactive drug use. UDS: +opiates; methadone; BAL=0. The patient was seen twice in the ED prior to admission. He endorsed anxiety and was sent home but then re-presented a few hours later and stated he did not feel safe on his own. Patient's wife accompanied him and corroborated his mental state as being off baseline. Patient reported that he had not used Fentanyl in weeks but acknowledges abusing this in addition to taking methadone (UDS positive for both agents). The patient is a fair historian. The patient corroborates the above narrative. The patient contracts for safety on the unit and gives consent for the team to contact collateral. The patient is amenable to initiating treatment while on the unit. Methadone dose verified via [facility]. 08/06 - no acute overnight events. The patient has been depressed, flat but cooperative. He c/o heartburn otherwise is doing well physically. He is tolerating medication thus far and slept 7 hours overnight. Patient got Mylanta x2 for heartburn with fair effect.  He Final    Methadone, Urine 08/04/2023 Positive (A)  NEG   Final    Opiates, Urine 08/04/2023 Positive (A)  NEG   Final    PCP, Urine 08/04/2023 Negative  NEG   Final    THC, TH-Cannabinol, Urine 08/04/2023 Negative  NEG   Final    Comments: 08/04/2023 (NOTE)   Final    Ethanol Lvl 08/04/2023 <10  <10 MG/DL Final         Assessment & Plan     The patient, Bebe Meza, is a 34 y.o.  male who presents at this time for treatment of the following diagnoses:  Adjustment disorder with depressed mood  ASSESSMENT: the patient presents with worsening anxiety and suicidal ideation in the setting of recent opioid use. Will detox, start an antidepressant with PRN anxiolytics as needed. Patient recently violated his probation and is in legal jeopardy once he is discharged; team urging patient to speak with his wife and  to  him on his options. PLAN:  - Observe off substances  - CONTINUE Remeron 15 mg QHS for MDD  - CONTINUE Buspar 10 mg BID for anxiety  - CONTINUE Melatonin 5 mg QHS for insomnia  - CONTINUE Methadone 155 mg QDAY for opioid use disorder  - CONTINUE Pantoprazole 40 mg BIDAC for GERD  - Patient may use laptop for work on unit with supervision  - IGM therapy as tolerated  - Expand database / obtain collateral  - Dispo planning (home when stable)    I certify that this patient's inpatient psychiatric hospital services furnished since the previous certification were, and continue to be,required for treatment that could reasonably be expected to improve the patient's condition, or for diagnostic study, and that the patient continues to need, on a daily basis, active treatment furnished directly by or requiring the supervision of inpatient psychiatric facility personnel. In addition, the hospital records show that services furnished were intensive treatment services, admission or related services, or equivalent services.     Signed By: Diana Seay MD     August 14, 2023

## 2023-08-14 NOTE — BH NOTE
Behavioral Health Treatment Team Note     Patient goal(s) for today: Take medications as prescribed, engage in unit activities Patient Continue taking meds as, participate in hygiene/ADLs, prepare for discharge, follow directions from staff, contact support team, attend groups   Treatment team focus/goals: will continue to maintain a calm and cooperative mood and communicate her needs with staff. Continue taking meds as prescribed, engage in unit activities, participate in hygiene/ADLs, prepare for discharge, follow directions from staff, contact support team, attend groups     Progress note: Patient met with treatment team in treatment room. Patient was alert and oriented x 4. Patient presents with an anxious mood, labile affect, and linear thought process. Patient reports that he feels like the treatment team does not have his best interest. Treatment team provided supportive counseling and education in regards to this belief. SW continues to communicate with PO as patient currently has an active warrant out for his arrest. Patient reports that he is having his  practice all communication moving forward with the PO. SW contacted wife and provided education and supportive counseling and informed her that patient needs to be home to get his needs met at this time as this is something that hospital cannot change or control. Patient still does not have insight in regards to current situation and appears to be wanting to stay in the hospital in effort to avoid current legal matter.      LOS:  10  Expected LOS: possible tomorrow     Insurance info/prescription coverage:  [employer]   Date of last family contact:  today   Family requesting physician contact today:  No  Discharge plan:  home or shelter  Guns in the home:  No  Outpatient provider(s): [facility]    Participating treatment team members: Sulema Song, Romero Najjar in social work, Filippo Franco MD

## 2023-08-14 NOTE — BH NOTE
Met pt in the dayroom watching TV, alert and oriented x 4. Presents calm and cooperative with constricted affect and sad mood. Pt was worried if he will be permitted to complete his office work on his tablet. Vital signs checked B/P was 151/92 mmHg. On assessment, Pt endorses anxiety, and depression of 7/10, denies SI/HI/AH/VH and pain. Scheduled med, PRN Trazodone and Nicotine Lozenges was given. Pt was allowed some hours to work  Q 15 min's rounding is ongoing for safety. Pt had a calm shift with no behavioral issues. Pt requested for anxiety pill, prn Atarax given @ 06:40   Pt slept for 7.30 hours.

## 2023-08-14 NOTE — PROGRESS NOTES
Behavioral Services                                              Medicare Re-Certification    I certify that the inpatient psychiatric hospital services furnished since the previous certification/re-certification were, and continue to be, medically necessary for;    [x] (1) Treatment which could reasonably be expected to improve the patient's condition,    [x] (2) Or for diagnostic study. Estimated length of stay/service 5 - 7 days    Plan for post-hospital care per social work    This patient continues to need, on a daily basis, active treatment furnished directly by or requiring the supervision of inpatient psychiatric personnel.     Electronically signed by Tobin Graham MD on 8/13/2023 at 11:52 PM

## 2023-08-14 NOTE — PROGRESS NOTES
Cosmo Duque has been out in the day room majority of the shift. Socializing appropriately with peers and staff. Cosmo Duque reports having increased anxiety rating it 9/10. He denies SI/HI/AVH currently. Med compliant.  PRN hydroxyzine administered per request.

## 2023-08-14 NOTE — BH NOTE
This writer attempted to contact patient's wife to discuss current discharge plan and concerns related to current active warrant, no answer left vm. Attempted to call wife again no answer. This writer spoke with patient's wife and addressed concerns in regards to patient feeling like he is not getting proper treatment. SW provided education in regards to this current stressor and how it is important for patient to address this with his  in person as many things cannot be done while he is on [address] Wife reports that she understands and will contact . SW informed wife that per PO, patient has a current warrant out for arrest and potentially may be arrested once he leaves hospital due to this, wife reports that she understands.        Edin Aviles, supervisee in social work

## 2023-08-15 PROBLEM — F11.10 OPIOID USE DISORDER, MILD, ABUSE (HCC): Status: ACTIVE | Noted: 2023-08-15

## 2023-08-15 LAB
AMPHET UR QL SCN: NEGATIVE
BARBITURATES UR QL SCN: NEGATIVE
BENZODIAZ UR QL: NEGATIVE
CANNABINOIDS UR QL SCN: NEGATIVE
COCAINE UR QL SCN: NEGATIVE
Lab: ABNORMAL
METHADONE UR QL: POSITIVE
OPIATES UR QL: NEGATIVE
PCP UR QL: NEGATIVE

## 2023-08-15 PROCEDURE — 6370000000 HC RX 637 (ALT 250 FOR IP): Performed by: INTERNAL MEDICINE

## 2023-08-15 PROCEDURE — 6370000000 HC RX 637 (ALT 250 FOR IP): Performed by: PSYCHIATRY & NEUROLOGY

## 2023-08-15 PROCEDURE — 80307 DRUG TEST PRSMV CHEM ANLYZR: CPT

## 2023-08-15 PROCEDURE — 99232 SBSQ HOSP IP/OBS MODERATE 35: CPT | Performed by: PSYCHIATRY & NEUROLOGY

## 2023-08-15 PROCEDURE — 1240000000 HC EMOTIONAL WELLNESS R&B

## 2023-08-15 PROCEDURE — 6370000000 HC RX 637 (ALT 250 FOR IP)

## 2023-08-15 PROCEDURE — 80354 DRUG SCREENING FENTANYL: CPT

## 2023-08-15 RX ADMIN — AMOXICILLIN AND CLAVULANATE POTASSIUM 1 TABLET: 875; 125 TABLET, FILM COATED ORAL at 17:11

## 2023-08-15 RX ADMIN — PANTOPRAZOLE SODIUM 40 MG: 40 TABLET, DELAYED RELEASE ORAL at 17:11

## 2023-08-15 RX ADMIN — HYDROXYZINE HYDROCHLORIDE 50 MG: 25 TABLET, FILM COATED ORAL at 21:07

## 2023-08-15 RX ADMIN — NICOTINE POLACRILEX 2 MG: 2 LOZENGE ORAL at 07:10

## 2023-08-15 RX ADMIN — MIRTAZAPINE 15 MG: 15 TABLET, FILM COATED ORAL at 21:07

## 2023-08-15 RX ADMIN — LOSARTAN POTASSIUM 50 MG: 25 TABLET, FILM COATED ORAL at 08:58

## 2023-08-15 RX ADMIN — BUSPIRONE HYDROCHLORIDE 15 MG: 10 TABLET ORAL at 21:07

## 2023-08-15 RX ADMIN — Medication 5 MG: at 21:07

## 2023-08-15 RX ADMIN — HYDROXYZINE HYDROCHLORIDE 50 MG: 25 TABLET, FILM COATED ORAL at 07:10

## 2023-08-15 RX ADMIN — PANTOPRAZOLE SODIUM 40 MG: 40 TABLET, DELAYED RELEASE ORAL at 07:10

## 2023-08-15 RX ADMIN — METHADONE HYDROCHLORIDE 155 MG: 10 TABLET ORAL at 08:59

## 2023-08-15 RX ADMIN — NICOTINE POLACRILEX 2 MG: 2 LOZENGE ORAL at 14:52

## 2023-08-15 RX ADMIN — NICOTINE POLACRILEX 2 MG: 2 LOZENGE ORAL at 21:07

## 2023-08-15 RX ADMIN — BUSPIRONE HYDROCHLORIDE 15 MG: 10 TABLET ORAL at 08:58

## 2023-08-15 RX ADMIN — AMOXICILLIN AND CLAVULANATE POTASSIUM 1 TABLET: 875; 125 TABLET, FILM COATED ORAL at 08:58

## 2023-08-15 RX ADMIN — HYDROXYZINE HYDROCHLORIDE 50 MG: 25 TABLET, FILM COATED ORAL at 14:52

## 2023-08-15 RX ADMIN — TRAZODONE HYDROCHLORIDE 50 MG: 50 TABLET ORAL at 21:07

## 2023-08-15 ASSESSMENT — PAIN SCALES - GENERAL: PAINLEVEL_OUTOF10: 0

## 2023-08-15 NOTE — BH NOTE
Behavioral Health Treatment Team Note     Patient goal(s) for today: Take medications as prescribed, engage in unit activities Patient Continue taking meds as, participate in hygiene/ADLs, prepare for discharge, follow directions from staff, contact support team, attend groups   Treatment team focus/goals: will continue to maintain a calm and cooperative mood and communicate her needs with staff. Continue taking meds as prescribed, engage in unit activities, participate in hygiene/ADLs, prepare for discharge, follow directions from staff, contact support team, attend groups     Progress note: Patient met with treatment team. Patient demands to stay an additional day as well as another urine drug screen to show that he is no longer positive for opiates. Treatment team attempted to help provide insight to patient about his inability to take account for having a positive drug screen prior to admission. Treatment team continues to question patient's motive and it is clear at this point patient is admitted for secondary gain. SW attempted to contact both his counselor from [facility] and his  and have not received a return phone call. Treatment team also encouraged him to deal with his current life stressor as he continues to avoid, he refused. He continues to remain preoccupied on a plan that he thinks will help him avoid going to halfway.     LOS:  11  Expected LOS: tomorrow     Insurance info/prescription coverage:  [employer]   Date of last family contact:  yesterday  Family requesting physician contact today:  No  Discharge plan:  home  Guns in the home:  No  Outpatient provider(s):  [facility]     Participating treatment team members: Arabella Redding, supervisee in social work, Yusef Barksdale MD

## 2023-08-15 NOTE — BH NOTE
Date: 8/14/2023  Start Time: 2:10  End Time:  3:00  Number of Participants: 5     Type of Group: Psychotherapy    Topic: Anxiety  Group facilitator led a psychoeducation group about anxiety and anxiety management skills. Group members identified their own anxiety symptoms, discussed what anxiety was, the different types of anxiety, and the treatment of anxiety. Group members were able to paticipate in the discussions with encouragement from the leader. Group facilitator discussed what phobias were and incorporated them into how different therapies, such as expousure, CBT, relaxation techniques, and medication. With the use of a handout, group facilitator discussed healthy and unhealthy coping skills and the participants filled out a worksheet identifying their own coping skills. Group facilitator concluded the session by leading the group in various relaxtation techniques, such as imagery, progressive muscle relaxation, and deep breathing. Group members remained engaged in the session while sharing their own coping skills and experinces. Group members were observed to be calm and focused. PATIENT DID NOT PARTICIPATE.     NORA Greco

## 2023-08-15 NOTE — BH NOTE
Date: 8/15  Time: 9:45 am to 10:40 AM  Type of group: Coping Skills  Topic: Stress Relief    Group facilitator led a coping skills group and stress management and reduction. Group members spoke about stress and how they cope with stress. They were able to identify coping skills and what stress was. They participated in discussion with encouragement from the faciliatator. Group faciliatator used handouts to educate patients about stress management and prevention. Patients were then led through a series of stress reduction activities, including chair yoga, meditation, and body scanning. Patients left the group feeling relaxed. The group appeare fatigued. Patient participated in group well. He was able to share his coping skills. Patient did become teary at a time but was able to calm himself down.      NORA Nickerson

## 2023-08-15 NOTE — BH NOTE
Morning assessment complete. Patient was encountered in dayroom tearful. He is visibly anxious during assessment noted by repeatedly shaking his knee. Patient reports being anxious but will not elaborate on what is wrong. Writer to leave patient to express emotions alone. After breakfast, writer to assess patient. Patient reports feeling better. Patient endorses anxiety and depression. Denies SI, HI or AVH. Patient has denied pain. Eye contact is noted to be good. Mood is noted to be sad and anxious. Patient has been out in the milieu watching TV and attending groups. Patient is both med and meal compliant. There are no side effects from medications to noted. C-SSRS level is low. Hourly rounds are being completed to assure patient safety and attend to care needs.  Monitoring will continue for changes in patient condition

## 2023-08-15 NOTE — PROGRESS NOTES
Patient was visible on the unit and pleasant. He appeared anxious and sad. He admitted to having worries over discharge tomorrow. He denied SI, HI, AVH and pain. His affect was flat. Eye contact was fair. He received PRN Atarax, Trazodone and nicotine lozenge at 2214 for anxiety and insomnia. He had supervised work on his tablet prior to going to sleep and appears to be sleeping fairly well.

## 2023-08-15 NOTE — BH NOTE
This writer contacted "[employer] and was tasked to be transferred to patient's counselor Jeanette. The voicemail has not been set up nor could this writer leave a message for a return call. This writer attempted to contact patient's  Nikolai Jocy [phone], no answer left vm on confidential voicemail for return phone call asap.        Aixa Gracia, supervisee in social work

## 2023-08-15 NOTE — PLAN OF CARE
Problem: Anxiety  Goal: Will report anxiety at manageable levels  Description: INTERVENTIONS:  1. Administer medication as ordered  2. Teach and rehearse alternative coping skills  3.  Provide emotional support with 1:1 interaction with staff  Outcome: Progressing  Flowsheets (Taken 8/15/2023 5937)  Will report anxiety at manageable levels: Administer medication as ordered

## 2023-08-16 VITALS
DIASTOLIC BLOOD PRESSURE: 83 MMHG | SYSTOLIC BLOOD PRESSURE: 140 MMHG | HEIGHT: 67 IN | WEIGHT: 265 LBS | HEART RATE: 91 BPM | OXYGEN SATURATION: 98 % | TEMPERATURE: 98 F | BODY MASS INDEX: 41.59 KG/M2 | RESPIRATION RATE: 16 BRPM

## 2023-08-16 PROCEDURE — 6370000000 HC RX 637 (ALT 250 FOR IP): Performed by: INTERNAL MEDICINE

## 2023-08-16 PROCEDURE — 6370000000 HC RX 637 (ALT 250 FOR IP): Performed by: PSYCHIATRY & NEUROLOGY

## 2023-08-16 PROCEDURE — 6370000000 HC RX 637 (ALT 250 FOR IP)

## 2023-08-16 PROCEDURE — 99239 HOSP IP/OBS DSCHRG MGMT >30: CPT | Performed by: PSYCHIATRY & NEUROLOGY

## 2023-08-16 RX ORDER — TRAZODONE HYDROCHLORIDE 50 MG/1
50 TABLET ORAL NIGHTLY PRN
Qty: 30 TABLET | Refills: 1 | Status: SHIPPED | OUTPATIENT
Start: 2023-08-16

## 2023-08-16 RX ORDER — BUSPIRONE HYDROCHLORIDE 15 MG/1
15 TABLET ORAL 2 TIMES DAILY
Qty: 60 TABLET | Refills: 1 | Status: SHIPPED | OUTPATIENT
Start: 2023-08-16

## 2023-08-16 RX ORDER — HYDROXYZINE 50 MG/1
50 TABLET, FILM COATED ORAL 3 TIMES DAILY PRN
Qty: 90 TABLET | Refills: 1 | Status: SHIPPED | OUTPATIENT
Start: 2023-08-16 | End: 2023-10-15

## 2023-08-16 RX ORDER — MIRTAZAPINE 15 MG/1
15 TABLET, FILM COATED ORAL NIGHTLY
Qty: 30 TABLET | Refills: 1 | Status: SHIPPED | OUTPATIENT
Start: 2023-08-16 | End: 2023-08-16 | Stop reason: SDUPTHER

## 2023-08-16 RX ORDER — AMOXICILLIN AND CLAVULANATE POTASSIUM 875; 125 MG/1; MG/1
1 TABLET, FILM COATED ORAL 2 TIMES DAILY WITH MEALS
Qty: 3 TABLET | Refills: 0 | Status: SHIPPED | OUTPATIENT
Start: 2023-08-16 | End: 2023-08-16 | Stop reason: SDUPTHER

## 2023-08-16 RX ORDER — MECOBALAMIN 5000 MCG
5 TABLET,DISINTEGRATING ORAL NIGHTLY
Qty: 30 TABLET | Refills: 1 | Status: SHIPPED | OUTPATIENT
Start: 2023-08-16

## 2023-08-16 RX ORDER — LOSARTAN POTASSIUM 50 MG/1
50 TABLET ORAL DAILY
Qty: 30 TABLET | Refills: 3 | Status: SHIPPED | OUTPATIENT
Start: 2023-08-16

## 2023-08-16 RX ORDER — BUSPIRONE HYDROCHLORIDE 15 MG/1
15 TABLET ORAL 2 TIMES DAILY
Qty: 60 TABLET | Refills: 1 | Status: SHIPPED | OUTPATIENT
Start: 2023-08-16 | End: 2023-08-16 | Stop reason: SDUPTHER

## 2023-08-16 RX ORDER — MECOBALAMIN 5000 MCG
5 TABLET,DISINTEGRATING ORAL NIGHTLY
Qty: 30 TABLET | Refills: 1 | Status: SHIPPED | OUTPATIENT
Start: 2023-08-16 | End: 2023-08-16 | Stop reason: SDUPTHER

## 2023-08-16 RX ORDER — HYDROXYZINE 50 MG/1
50 TABLET, FILM COATED ORAL 3 TIMES DAILY PRN
Qty: 90 TABLET | Refills: 1 | Status: SHIPPED | OUTPATIENT
Start: 2023-08-16 | End: 2023-08-16 | Stop reason: SDUPTHER

## 2023-08-16 RX ORDER — AMOXICILLIN AND CLAVULANATE POTASSIUM 875; 125 MG/1; MG/1
1 TABLET, FILM COATED ORAL 2 TIMES DAILY WITH MEALS
Qty: 3 TABLET | Refills: 0 | Status: SHIPPED | OUTPATIENT
Start: 2023-08-16 | End: 2023-08-18

## 2023-08-16 RX ORDER — MIRTAZAPINE 15 MG/1
15 TABLET, FILM COATED ORAL NIGHTLY
Qty: 30 TABLET | Refills: 1 | Status: SHIPPED | OUTPATIENT
Start: 2023-08-16

## 2023-08-16 RX ORDER — TRAZODONE HYDROCHLORIDE 50 MG/1
50 TABLET ORAL NIGHTLY PRN
Qty: 30 TABLET | Refills: 1 | Status: SHIPPED | OUTPATIENT
Start: 2023-08-16 | End: 2023-08-16 | Stop reason: SDUPTHER

## 2023-08-16 RX ADMIN — NICOTINE POLACRILEX 2 MG: 2 LOZENGE ORAL at 06:57

## 2023-08-16 RX ADMIN — HYDROXYZINE HYDROCHLORIDE 50 MG: 25 TABLET, FILM COATED ORAL at 06:57

## 2023-08-16 RX ADMIN — BUSPIRONE HYDROCHLORIDE 15 MG: 10 TABLET ORAL at 08:41

## 2023-08-16 RX ADMIN — METHADONE HYDROCHLORIDE 155 MG: 10 TABLET ORAL at 08:39

## 2023-08-16 RX ADMIN — AMOXICILLIN AND CLAVULANATE POTASSIUM 1 TABLET: 875; 125 TABLET, FILM COATED ORAL at 08:40

## 2023-08-16 RX ADMIN — PANTOPRAZOLE SODIUM 40 MG: 40 TABLET, DELAYED RELEASE ORAL at 06:57

## 2023-08-16 RX ADMIN — LOSARTAN POTASSIUM 50 MG: 25 TABLET, FILM COATED ORAL at 08:39

## 2023-08-16 RX ADMIN — NICOTINE POLACRILEX 2 MG: 2 LOZENGE ORAL at 08:40

## 2023-08-16 NOTE — PROGRESS NOTES
Pharmacist Discharge Medication Reconciliation    Discharging Provider: Dr. Ave Sorensen PMH: History reviewed. No pertinent past medical history. Chief Complaint for this Admission:   Chief Complaint   Patient presents with    Mental Health Problem     Allergies: Patient has no known allergies. Discharge Medications:   Current Discharge Medication List        START taking these medications    Details   busPIRone (BUSPAR) 15 MG tablet Take 15 mg by mouth 2 times daily  Qty: 60 tablet, Refills: 1      mirtazapine (REMERON) 15 MG tablet Take 1 tablet by mouth nightly  Qty: 30 tablet, Refills: 1      traZODone (DESYREL) 50 MG tablet Take 1 tablet by mouth nightly as needed for Sleep  Qty: 30 tablet, Refills: 1      melatonin 5 MG TBDP disintegrating tablet Take 1 tablet by mouth nightly  Qty: 30 tablet, Refills: 1      amoxicillin-clavulanate (AUGMENTIN) 875-125 MG per tablet Take 1 tablet by mouth 2 times daily (with meals) for 3 doses  Qty: 3 tablet, Refills: 0           CONTINUE these medications which have CHANGED    Details   hydrOXYzine HCl (ATARAX) 50 MG tablet Take 1 tablet by mouth 3 times daily as needed for Anxiety  Qty: 90 tablet, Refills: 1           CONTINUE these medications which have NOT CHANGED    Details   METHADONE HCL PO Take 155 mg by mouth daily Dose verified by [facility] [phone] Max Daily Amount: 155 mg      famotidine (PEPCID) 20 MG tablet Take 1 tablet by mouth 2 times daily      losartan (COZAAR) 50 MG tablet Take 1 tablet by mouth daily      omeprazole (PRILOSEC) 40 MG delayed release capsule Take 1 capsule by mouth every morning (before breakfast) On empty stomach             The patient's chart, MAR and AVS were reviewed by Crystal Alcala RPH.

## 2023-08-16 NOTE — BH NOTE
Pt is alert and oriented x person, place and time. Pt denies any SI/HI or AV hallucinations. Pt denies any depression. Pt plans to return home. Discharge information reviewed with patient. Pt verbalizes understanding. Pt's belongings/valuables returned. Pt to be transported home by wife.

## 2023-08-16 NOTE — DISCHARGE SUMMARY
cooperative and pleasant. He is medication compliant and denies active thoughts of self harm. Patient reports improvement in his neck (Abx started for sialadenitis per medicine consult). He continues to voice ambivalence about leaving the hospital but denies SI/HI/AVH/PI per RN and MD reports over the weekend. He states that he is anxious, and continues to deny that this anxiety is related to his ongoing legal circumstances, though he is preoccupied with the situation. Patient reports that his  is taking care of things. Of note, patient's  is in contact with SW and states that no one has been in contact with her on the patient's behalf. MD spoke with patient's wife Sharona Matthews, and reiterated the patient's labwork findings and behavior on the unit. She is in agreement with plan for him to discharge tomorrow. 08/15 - no acute overnight events. Patient has been visible, anxious and slept 7.5 hours overnight. He denies SI/HI/AVH/PI. He states he is having panic attacks that he maintains are unrelated to his present circumstances. Patient again counseled on his two urine drug screens obtained at admission, but he refutes the team's assessment that he relapsed on opiates within a few days of admission. He requests to stay another night in the hospital and asks for another drug screen, which is performed and shows a now negative opioid and continued positive methadone result. Patient's motivations are questioned by the team. He continues to deny any illicit substance abuse but acknowledges having failed a drug screen and thereby violating his probation prior to admission. He states this failed test was due to human error and a result of his  acting against his interests. Patient given 24 hour notice of administration discharge.        HOSPITALIZATION COURSE:    Huber Borja was admitted to the inpatient psychiatric unit [facility] for acute psychiatric times daily (with meals) for 3 doses     busPIRone 15 MG tablet  Commonly known as: BUSPAR  Take 15 mg by mouth 2 times daily     melatonin 5 MG Tbdp disintegrating tablet  Take 1 tablet by mouth nightly     mirtazapine 15 MG tablet  Commonly known as: REMERON  Take 1 tablet by mouth nightly     traZODone 50 MG tablet  Commonly known as: DESYREL  Take 1 tablet by mouth nightly as needed for Sleep            CHANGE how you take these medications      hydrOXYzine HCl 50 MG tablet  Commonly known as: ATARAX  Take 1 tablet by mouth 3 times daily as needed for Anxiety  What changed:   when to take this  reasons to take this            CONTINUE taking these medications      famotidine 20 MG tablet  Commonly known as: PEPCID     losartan 50 MG tablet  Commonly known as: COZAAR  Take 1 tablet by mouth daily     METHADONE HCL PO     omeprazole 40 MG delayed release capsule  Commonly known as: PRILOSEC            STOP taking these medications      HYDROcodone-acetaminophen 5-325 MG per tablet  Commonly known as: Leo Morataya               Where to Get Your Medications        These medications were sent to [facility]/pharmacy #[id number] [phone]  [address] [place], [place] [id number]      Hours: 24-hours Phone: [phone]   amoxicillin-clavulanate 875-125 MG per tablet  busPIRone 15 MG tablet  hydrOXYzine HCl 50 MG tablet  losartan 50 MG tablet  melatonin 5 MG Tbdp disintegrating tablet  mirtazapine 15 MG tablet  traZODone 50 MG tablet              A coordinated, multidisplinary treatment team round was conducted with Richelle Miguel is done daily here at [facility]. This team consists of the nurse, psychiatric unit pharmacist,  and Leona Mares. I have spent greater than 35 minutes on discharge work.     Signed:  Jignesh Ruby MD  8/16/2023

## 2023-08-16 NOTE — PLAN OF CARE
Problem: Discharge Planning  Goal: Discharge to home or other facility with appropriate resources  8/16/2023 0801 by Jacob Olivera RN  Outcome: Completed  8/16/2023 0758 by Jacob Olivera RN  Outcome: Progressing     Problem: Safety - Adult  Goal: Free from fall injury  Outcome: Completed     Problem: Pain  Goal: Verbalizes/displays adequate comfort level or baseline comfort level  Outcome: Completed     Problem: Anxiety  Goal: Will report anxiety at manageable levels  Description: INTERVENTIONS:  1. Administer medication as ordered  2. Teach and rehearse alternative coping skills  3. Provide emotional support with 1:1 interaction with staff  Outcome: Completed  Flowsheets (Taken 8/15/2023 1930 by Nereida Plascencia RN)  Will report anxiety at manageable levels:   Administer medication as ordered   Provide emotional support with 1:1 interaction with staff   Teach and rehearse alternative coping skills     Problem: Coping  Goal: Pt/Family able to verbalize concerns and demonstrate effective coping strategies  Description: INTERVENTIONS:  1. Assist patient/family to identify coping skills, available support systems and cultural and spiritual values  2. Provide emotional support, including active listening and acknowledgement of concerns of patient and caregivers  3. Reduce environmental stimuli, as able  4. Instruct patient/family in relaxation techniques, as appropriate  5.  Assess for spiritual pain/suffering and initiate Spiritual Care, Psychosocial Clinical Specialist consults as needed  Outcome: Completed  Flowsheets (Taken 8/15/2023 1930 by Nereida Plascencia RN)  Patient/family able to verbalize anxieties, fears, and concerns, and demonstrate effective coping: Provide emotional support, including active listening and acknowledgement of concerns of patient and caregivers     Problem: Decision Making  Goal: Pt/Family able to effectively weigh alternatives and participate in decision making related to treatment and

## 2023-08-16 NOTE — PROGRESS NOTES
Patient was alert and oriented. Affect was flat, demeanor anxious. He was social with staff and appeared to relax somewhat during conversation. He denied SI, HI, AVH and pain. He stated he felt depressed and anxious about discharge on the 16th. He was med compliant. He received Atarax, Trazodone and nicotine lozenge at  for anxiety and insomnia. He appears to be sleeping well.

## 2023-08-22 LAB — FENTANYL URINE: NORMAL PG/ML

## 2024-04-17 NOTE — BSMART NOTE
BSMART Note    Patient returned to the ER approximately 3 hours after being discharged by this clinician. He stated \"I got home and I didn't feel safe. \"  He is requesting voluntary admission. Patient continues to report suicidal ideation with no plan. His wife is supportive of psychiatric admission. He is aware he will have to give another urine sample for a drug screen and provide blood for an alcohol level. Patient remains low risk on the suicidal scale. He is now requesting voluntary admission. Patient has been accepted to [facility] room 323 bed 1 by ODETTE Watts for Dr. Greer Santana.     Braulio Varghese MA
BSMART assessment completed, and suicide risk level noted to be low. Primary Nurse Megan Vuong and Physician Dr. Gail Archer notified. Concerns not observed. Security/Off- has not been notified.
oral

## 2024-05-01 ENCOUNTER — OFFICE VISIT (OUTPATIENT)
Age: 39
End: 2024-05-01
Payer: COMMERCIAL

## 2024-05-01 VITALS
SYSTOLIC BLOOD PRESSURE: 144 MMHG | DIASTOLIC BLOOD PRESSURE: 87 MMHG | BODY MASS INDEX: 48.84 KG/M2 | TEMPERATURE: 99.4 F | OXYGEN SATURATION: 95 % | HEART RATE: 96 BPM | WEIGHT: 311.2 LBS | RESPIRATION RATE: 18 BRPM | HEIGHT: 67 IN

## 2024-05-01 DIAGNOSIS — D23.9 FIBROMA OF SKIN: Primary | ICD-10-CM

## 2024-05-01 PROCEDURE — 99202 OFFICE O/P NEW SF 15 MIN: CPT | Performed by: SURGERY

## 2024-05-01 ASSESSMENT — PATIENT HEALTH QUESTIONNAIRE - PHQ9
SUM OF ALL RESPONSES TO PHQ QUESTIONS 1-9: 0
2. FEELING DOWN, DEPRESSED OR HOPELESS: NOT AT ALL
SUM OF ALL RESPONSES TO PHQ QUESTIONS 1-9: 0
1. LITTLE INTEREST OR PLEASURE IN DOING THINGS: NOT AT ALL
SUM OF ALL RESPONSES TO PHQ QUESTIONS 1-9: 0
SUM OF ALL RESPONSES TO PHQ9 QUESTIONS 1 & 2: 0
SUM OF ALL RESPONSES TO PHQ QUESTIONS 1-9: 0

## 2024-05-01 NOTE — PROGRESS NOTES
Surgery History and Physical    Subjective:      Darnell Benjamin  is a 38 y.o.   male who presents with a lesion of the skin of his left lower calf..It has been present for a while and he is just concerned about it.    Past Medical History:   Diagnosis Date    Fibroma of skin, left calf 05/01/2024       No past surgical history on file.    Social History     Tobacco Use    Smoking status: Every Day    Smokeless tobacco: Not on file   Substance Use Topics    Alcohol use: Yes       No family history on file.    Current Outpatient Medications on File Prior to Visit   Medication Sig Dispense Refill    METHADONE HCL PO Take 155 mg by mouth daily Dose verified by [place] Cecilia Saenz [phone] Max Daily Amount: 155 mg      omeprazole (PRILOSEC) 40 MG delayed release capsule Take 1 capsule by mouth every morning (before breakfast) On empty stomach       No current facility-administered medications on file prior to visit.       No Known Allergies      Review of Systems:    Pertinent items are noted in the History of Present Illness.    Objective:     Vitals:    05/01/24 1054   BP: (!) 144/87   Pulse:    Resp:    Temp:    SpO2:         Physical Exam:  GENERAL: alert, appears stated age, and cooperative, SKIN: 7 mm fibroma of the skin of his left lower calf anteriorly    Labs: No results found for this or any previous visit (from the past 24 hour(s)).    Data Review:  noneI     Assessment and Plan:      Diagnosis Orders   1. Fibroma of skin, left calf            I recommended kust leaving it alone unless it becomes symptomatic or enlarges. He will keep an eye on it.      Signed By: Lenard Justin MD     05/01/24

## 2024-05-01 NOTE — PROGRESS NOTES
Identified patient with two patient identifiers (name and ). Reviewed chart in preparation for visit and have obtained necessary documentation.    Darnell Benjamin is a 38 y.o. male  Chief Complaint   Patient presents with    New Patient     Lesion on ankle     BP (!) 144/87 (Site: Left Upper Arm, Position: Sitting, Cuff Size: Large Adult)   Pulse 96   Temp 99.4 °F (37.4 °C) (Oral)   Resp 18   Ht 1.702 m (5' 7\")   Wt (!) 141.2 kg (311 lb 3.2 oz)   SpO2 95%   BMI 48.74 kg/m²     1. Have you been to the ER, urgent care clinic since your last visit?  Hospitalized since your last visit?no    2. Have you seen or consulted any other health care providers outside of the [facility] since your last visit?  Include any pap smears or colon screening. No    Patient and provider made aware of elevated BP x2. Patient asymptomatic. Patient reminded to monitor BP, continue to take BP medications if prescribed, and follow up with PCP/Cardiologist.  Patient expressed understanding and agreement.      Per patient is taking BP medication but cannot remember name. Per patient looking for PCP.    Patient was provided updated PCP list.